# Patient Record
Sex: FEMALE | Race: WHITE | NOT HISPANIC OR LATINO | Employment: PART TIME | URBAN - METROPOLITAN AREA
[De-identification: names, ages, dates, MRNs, and addresses within clinical notes are randomized per-mention and may not be internally consistent; named-entity substitution may affect disease eponyms.]

---

## 2024-01-11 ENCOUNTER — LAB REQUISITION (OUTPATIENT)
Dept: LAB | Facility: HOSPITAL | Age: 64
End: 2024-01-11
Payer: COMMERCIAL

## 2024-01-11 DIAGNOSIS — L97.522 NON-PRESSURE CHRONIC ULCER OF OTHER PART OF LEFT FOOT WITH FAT LAYER EXPOSED (MULTI): ICD-10-CM

## 2024-01-11 PROCEDURE — 87075 CULTR BACTERIA EXCEPT BLOOD: CPT

## 2024-01-11 PROCEDURE — 87185 SC STD ENZYME DETCJ PER NZM: CPT

## 2024-01-11 PROCEDURE — 87205 SMEAR GRAM STAIN: CPT

## 2024-01-11 PROCEDURE — 87186 SC STD MICRODIL/AGAR DIL: CPT

## 2024-01-11 PROCEDURE — 87070 CULTURE OTHR SPECIMN AEROBIC: CPT

## 2024-01-14 LAB
B-LACTAMASE ORGANISM ISLT: POSITIVE
BACTERIA SPEC CULT: ABNORMAL
BACTERIA SPEC CULT: ABNORMAL
GRAM STN SPEC: ABNORMAL
GRAM STN SPEC: ABNORMAL

## 2024-03-05 ENCOUNTER — HOSPITAL ENCOUNTER (OUTPATIENT)
Dept: RADIOLOGY | Facility: HOSPITAL | Age: 64
Discharge: HOME | End: 2024-03-05
Payer: COMMERCIAL

## 2024-03-05 ENCOUNTER — OFFICE VISIT (OUTPATIENT)
Dept: WOUND CARE | Facility: HOSPITAL | Age: 64
End: 2024-03-05
Payer: COMMERCIAL

## 2024-03-05 DIAGNOSIS — L89.893 PRESSURE INJURY OF RIGHT FOOT, STAGE 3 (MULTI): ICD-10-CM

## 2024-03-05 PROCEDURE — 99214 OFFICE O/P EST MOD 30 MIN: CPT | Mod: 25

## 2024-03-05 PROCEDURE — 87186 SC STD MICRODIL/AGAR DIL: CPT | Mod: WESLAB

## 2024-03-05 PROCEDURE — 11042 DBRDMT SUBQ TIS 1ST 20SQCM/<: CPT

## 2024-03-05 PROCEDURE — 73630 X-RAY EXAM OF FOOT: CPT | Mod: RT

## 2024-03-08 LAB
BACTERIA SPEC CULT: ABNORMAL
GRAM STN SPEC: ABNORMAL
GRAM STN SPEC: ABNORMAL

## 2024-03-14 ENCOUNTER — OFFICE VISIT (OUTPATIENT)
Dept: WOUND CARE | Facility: HOSPITAL | Age: 64
DRG: 574 | End: 2024-03-14
Payer: COMMERCIAL

## 2024-03-14 ENCOUNTER — APPOINTMENT (OUTPATIENT)
Dept: RADIOLOGY | Facility: HOSPITAL | Age: 64
DRG: 574 | End: 2024-03-14
Payer: COMMERCIAL

## 2024-03-14 ENCOUNTER — HOSPITAL ENCOUNTER (INPATIENT)
Facility: HOSPITAL | Age: 64
LOS: 6 days | Discharge: HOME | DRG: 574 | End: 2024-03-20
Attending: EMERGENCY MEDICINE | Admitting: INTERNAL MEDICINE
Payer: COMMERCIAL

## 2024-03-14 DIAGNOSIS — L02.619 CELLULITIS AND ABSCESS OF FOOT: ICD-10-CM

## 2024-03-14 DIAGNOSIS — L03.115 CELLULITIS OF RIGHT LOWER EXTREMITY: Primary | ICD-10-CM

## 2024-03-14 DIAGNOSIS — L02.91 ABSCESS: ICD-10-CM

## 2024-03-14 DIAGNOSIS — M86.9 OSTEOMYELITIS OF ANKLE AND FOOT (MULTI): ICD-10-CM

## 2024-03-14 DIAGNOSIS — L03.119 CELLULITIS AND ABSCESS OF FOOT: ICD-10-CM

## 2024-03-14 LAB
ALBUMIN SERPL-MCNC: 4 G/DL (ref 3.5–5)
ALP BLD-CCNC: 73 U/L (ref 35–125)
ALT SERPL-CCNC: 13 U/L (ref 5–40)
ANION GAP SERPL CALC-SCNC: 13 MMOL/L
AST SERPL-CCNC: 15 U/L (ref 5–40)
BASOPHILS # BLD AUTO: 0.02 X10*3/UL (ref 0–0.1)
BASOPHILS NFR BLD AUTO: 0.3 %
BILIRUB SERPL-MCNC: <0.2 MG/DL (ref 0.1–1.2)
BUN SERPL-MCNC: 16 MG/DL (ref 8–25)
CALCIUM SERPL-MCNC: 9.3 MG/DL (ref 8.5–10.4)
CHLORIDE SERPL-SCNC: 97 MMOL/L (ref 97–107)
CO2 SERPL-SCNC: 26 MMOL/L (ref 24–31)
CREAT SERPL-MCNC: 0.4 MG/DL (ref 0.4–1.6)
EGFRCR SERPLBLD CKD-EPI 2021: >90 ML/MIN/1.73M*2
EOSINOPHIL # BLD AUTO: 0.33 X10*3/UL (ref 0–0.7)
EOSINOPHIL NFR BLD AUTO: 4.5 %
ERYTHROCYTE [DISTWIDTH] IN BLOOD BY AUTOMATED COUNT: 14.2 % (ref 11.5–14.5)
EST. AVERAGE GLUCOSE BLD GHB EST-MCNC: 120 MG/DL
GLUCOSE SERPL-MCNC: 101 MG/DL (ref 65–99)
HBA1C MFR BLD: 5.8 %
HCT VFR BLD AUTO: 38 % (ref 36–46)
HGB BLD-MCNC: 12.2 G/DL (ref 12–16)
IMM GRANULOCYTES # BLD AUTO: 0.03 X10*3/UL (ref 0–0.7)
IMM GRANULOCYTES NFR BLD AUTO: 0.4 % (ref 0–0.9)
LYMPHOCYTES # BLD AUTO: 0.93 X10*3/UL (ref 1.2–4.8)
LYMPHOCYTES NFR BLD AUTO: 12.8 %
MCH RBC QN AUTO: 30.3 PG (ref 26–34)
MCHC RBC AUTO-ENTMCNC: 32.1 G/DL (ref 32–36)
MCV RBC AUTO: 95 FL (ref 80–100)
MONOCYTES # BLD AUTO: 0.58 X10*3/UL (ref 0.1–1)
MONOCYTES NFR BLD AUTO: 8 %
NEUTROPHILS # BLD AUTO: 5.4 X10*3/UL (ref 1.2–7.7)
NEUTROPHILS NFR BLD AUTO: 74 %
NRBC BLD-RTO: 0 /100 WBCS (ref 0–0)
PLATELET # BLD AUTO: 352 X10*3/UL (ref 150–450)
POTASSIUM SERPL-SCNC: 4.2 MMOL/L (ref 3.4–5.1)
PROT SERPL-MCNC: 7.7 G/DL (ref 5.9–7.9)
RBC # BLD AUTO: 4.02 X10*6/UL (ref 4–5.2)
SODIUM SERPL-SCNC: 136 MMOL/L (ref 133–145)
WBC # BLD AUTO: 7.3 X10*3/UL (ref 4.4–11.3)

## 2024-03-14 PROCEDURE — 73630 X-RAY EXAM OF FOOT: CPT | Mod: RIGHT SIDE | Performed by: RADIOLOGY

## 2024-03-14 PROCEDURE — 85025 COMPLETE CBC W/AUTO DIFF WBC: CPT | Performed by: EMERGENCY MEDICINE

## 2024-03-14 PROCEDURE — 87040 BLOOD CULTURE FOR BACTERIA: CPT | Mod: WESLAB | Performed by: EMERGENCY MEDICINE

## 2024-03-14 PROCEDURE — 73630 X-RAY EXAM OF FOOT: CPT | Mod: RT

## 2024-03-14 PROCEDURE — 80053 COMPREHEN METABOLIC PANEL: CPT | Performed by: EMERGENCY MEDICINE

## 2024-03-14 PROCEDURE — 96365 THER/PROPH/DIAG IV INF INIT: CPT

## 2024-03-14 PROCEDURE — 99285 EMERGENCY DEPT VISIT HI MDM: CPT | Mod: 25

## 2024-03-14 PROCEDURE — 96375 TX/PRO/DX INJ NEW DRUG ADDON: CPT

## 2024-03-14 PROCEDURE — 87070 CULTURE OTHR SPECIMN AEROBIC: CPT | Mod: WESLAB

## 2024-03-14 PROCEDURE — 83036 HEMOGLOBIN GLYCOSYLATED A1C: CPT

## 2024-03-14 PROCEDURE — 2500000004 HC RX 250 GENERAL PHARMACY W/ HCPCS (ALT 636 FOR OP/ED)

## 2024-03-14 PROCEDURE — 2500000004 HC RX 250 GENERAL PHARMACY W/ HCPCS (ALT 636 FOR OP/ED): Performed by: EMERGENCY MEDICINE

## 2024-03-14 PROCEDURE — 36415 COLL VENOUS BLD VENIPUNCTURE: CPT | Performed by: EMERGENCY MEDICINE

## 2024-03-14 PROCEDURE — 1210000001 HC SEMI-PRIVATE ROOM DAILY

## 2024-03-14 PROCEDURE — 11042 DBRDMT SUBQ TIS 1ST 20SQCM/<: CPT

## 2024-03-14 RX ORDER — LINEZOLID 2 MG/ML
600 INJECTION, SOLUTION INTRAVENOUS EVERY 12 HOURS
Status: DISCONTINUED | OUTPATIENT
Start: 2024-03-14 | End: 2024-03-15

## 2024-03-14 RX ORDER — IBUPROFEN 100 MG/5ML
2000 SUSPENSION, ORAL (FINAL DOSE FORM) ORAL DAILY
COMMUNITY

## 2024-03-14 RX ORDER — ASPIRIN 81 MG/1
81 TABLET ORAL DAILY
Status: DISCONTINUED | OUTPATIENT
Start: 2024-03-15 | End: 2024-03-20 | Stop reason: HOSPADM

## 2024-03-14 RX ORDER — OXYCODONE AND ACETAMINOPHEN 5; 325 MG/1; MG/1
1 TABLET ORAL EVERY 4 HOURS PRN
Status: DISCONTINUED | OUTPATIENT
Start: 2024-03-14 | End: 2024-03-20 | Stop reason: HOSPADM

## 2024-03-14 RX ORDER — ONDANSETRON 4 MG/1
4 TABLET, FILM COATED ORAL EVERY 8 HOURS PRN
Status: DISCONTINUED | OUTPATIENT
Start: 2024-03-14 | End: 2024-03-20 | Stop reason: HOSPADM

## 2024-03-14 RX ORDER — ASPIRIN 81 MG/1
81 TABLET ORAL DAILY
COMMUNITY

## 2024-03-14 RX ORDER — DEXTROMETHORPHAN HYDROBROMIDE, GUAIFENESIN 5; 100 MG/5ML; MG/5ML
650 LIQUID ORAL EVERY 8 HOURS PRN
COMMUNITY

## 2024-03-14 RX ORDER — ASCORBIC ACID 500 MG
2000 TABLET ORAL DAILY
Status: DISCONTINUED | OUTPATIENT
Start: 2024-03-15 | End: 2024-03-15

## 2024-03-14 RX ORDER — DEXAMETHASONE SODIUM PHOSPHATE 100 MG/10ML
10 INJECTION INTRAMUSCULAR; INTRAVENOUS ONCE
Status: COMPLETED | OUTPATIENT
Start: 2024-03-14 | End: 2024-03-14

## 2024-03-14 RX ORDER — ELECTROLYTES/DEXTROSE
10 SOLUTION, ORAL ORAL DAILY
COMMUNITY

## 2024-03-14 RX ORDER — MULTIVIT-MIN/IRON FUM/FOLIC AC 7.5 MG-4
1 TABLET ORAL DAILY
Status: DISCONTINUED | OUTPATIENT
Start: 2024-03-15 | End: 2024-03-15

## 2024-03-14 RX ORDER — SODIUM CHLORIDE 9 MG/ML
50 INJECTION, SOLUTION INTRAVENOUS CONTINUOUS
Status: DISCONTINUED | OUTPATIENT
Start: 2024-03-14 | End: 2024-03-15

## 2024-03-14 RX ORDER — ACETAMINOPHEN 325 MG/1
650 TABLET ORAL EVERY 6 HOURS PRN
Status: DISCONTINUED | OUTPATIENT
Start: 2024-03-14 | End: 2024-03-20 | Stop reason: HOSPADM

## 2024-03-14 RX ORDER — DIPHENHYDRAMINE HYDROCHLORIDE 50 MG/ML
25 INJECTION INTRAMUSCULAR; INTRAVENOUS EVERY 6 HOURS PRN
Status: DISCONTINUED | OUTPATIENT
Start: 2024-03-14 | End: 2024-03-20 | Stop reason: HOSPADM

## 2024-03-14 RX ORDER — PANTOPRAZOLE SODIUM 40 MG/1
40 TABLET, DELAYED RELEASE ORAL DAILY
Status: DISCONTINUED | OUTPATIENT
Start: 2024-03-14 | End: 2024-03-20 | Stop reason: HOSPADM

## 2024-03-14 RX ORDER — HEPARIN SODIUM 5000 [USP'U]/ML
5000 INJECTION, SOLUTION INTRAVENOUS; SUBCUTANEOUS EVERY 12 HOURS
Status: DISCONTINUED | OUTPATIENT
Start: 2024-03-14 | End: 2024-03-20 | Stop reason: HOSPADM

## 2024-03-14 RX ORDER — DIPHENHYDRAMINE HYDROCHLORIDE 50 MG/ML
25 INJECTION INTRAMUSCULAR; INTRAVENOUS ONCE
Status: COMPLETED | OUTPATIENT
Start: 2024-03-14 | End: 2024-03-14

## 2024-03-14 RX ORDER — UBIDECARENONE 75 MG
500 CAPSULE ORAL DAILY
COMMUNITY

## 2024-03-14 RX ORDER — GLUCOSAMINE HCL 500 MG
2 TABLET ORAL DAILY
COMMUNITY

## 2024-03-14 RX ORDER — UBIDECARENONE 75 MG
500 CAPSULE ORAL DAILY
Status: DISCONTINUED | OUTPATIENT
Start: 2024-03-15 | End: 2024-03-15

## 2024-03-14 RX ADMIN — SODIUM CHLORIDE 50 ML/HR: 900 INJECTION, SOLUTION INTRAVENOUS at 21:26

## 2024-03-14 RX ADMIN — PIPERACILLIN SODIUM AND TAZOBACTAM SODIUM 3.38 G: 3; .375 INJECTION, SOLUTION INTRAVENOUS at 17:48

## 2024-03-14 RX ADMIN — SODIUM CHLORIDE 1000 ML: 900 INJECTION, SOLUTION INTRAVENOUS at 18:16

## 2024-03-14 RX ADMIN — DEXAMETHASONE SODIUM PHOSPHATE 10 MG: 10 INJECTION INTRAMUSCULAR; INTRAVENOUS at 18:14

## 2024-03-14 RX ADMIN — DIPHENHYDRAMINE HYDROCHLORIDE 25 MG: 50 INJECTION, SOLUTION INTRAMUSCULAR; INTRAVENOUS at 18:14

## 2024-03-14 RX ADMIN — LINEZOLID 600 MG: 600 INJECTION, SOLUTION INTRAVENOUS at 21:26

## 2024-03-14 ASSESSMENT — COGNITIVE AND FUNCTIONAL STATUS - GENERAL
DAILY ACTIVITIY SCORE: 24
MOBILITY SCORE: 24

## 2024-03-14 ASSESSMENT — COLUMBIA-SUICIDE SEVERITY RATING SCALE - C-SSRS
1. IN THE PAST MONTH, HAVE YOU WISHED YOU WERE DEAD OR WISHED YOU COULD GO TO SLEEP AND NOT WAKE UP?: NO
2. HAVE YOU ACTUALLY HAD ANY THOUGHTS OF KILLING YOURSELF?: NO
6. HAVE YOU EVER DONE ANYTHING, STARTED TO DO ANYTHING, OR PREPARED TO DO ANYTHING TO END YOUR LIFE?: NO

## 2024-03-14 ASSESSMENT — ENCOUNTER SYMPTOMS
HEMATOLOGIC/LYMPHATIC NEGATIVE: 1
CARDIOVASCULAR NEGATIVE: 1
PSYCHIATRIC NEGATIVE: 1
ACTIVITY CHANGE: 1
EYES NEGATIVE: 1
ALLERGIC/IMMUNOLOGIC NEGATIVE: 1
WOUND: 1
RESPIRATORY NEGATIVE: 1
ENDOCRINE NEGATIVE: 1
GASTROINTESTINAL NEGATIVE: 1
COLOR CHANGE: 1

## 2024-03-14 ASSESSMENT — PAIN DESCRIPTION - ORIENTATION: ORIENTATION: RIGHT

## 2024-03-14 ASSESSMENT — PAIN DESCRIPTION - LOCATION: LOCATION: FOOT

## 2024-03-14 ASSESSMENT — PAIN - FUNCTIONAL ASSESSMENT: PAIN_FUNCTIONAL_ASSESSMENT: 0-10

## 2024-03-14 ASSESSMENT — PAIN DESCRIPTION - DESCRIPTORS: DESCRIPTORS: THROBBING

## 2024-03-14 ASSESSMENT — PAIN SCALES - GENERAL: PAINLEVEL_OUTOF10: 4

## 2024-03-14 NOTE — PROGRESS NOTES
Pharmacy Medication History Review    Regina Flaherty is a 63 y.o. female admitted for No Principal Problem: There is no principal problem currently on the Problem List. Please update the Problem List and refresh.. Pharmacy reviewed the patient's [id number] medications and allergies for accuracy.    Medications ADDED:  ALL  Medications CHANGED:  none  Medications REMOVED:   none     The list below reflects the updated PTA list. Comments regarding how patient may be taking medications differently can be found in the Admit Orders Activity  Prior to Admission Medications   Prescriptions Last Dose Informant   acetaminophen (Tylenol 8 HOUR) 650 mg ER tablet  Self   Sig: Take 1 tablet (650 mg) by mouth every 8 hours if needed for mild pain (1 - 3). Do not crush, chew, or split.   ascorbic acid (Vitamin C) 1,000 mg tablet  Self   Sig: Take 2 tablets (2,000 mg) by mouth once daily.   ascorbic acid/collagen hydr (COLLAGEN SKIN RENEWAL ORAL)  Self   Sig: Take 2,500 mg by mouth once daily.   aspirin 81 mg EC tablet  Self   Sig: Take 1 tablet (81 mg) by mouth once daily.   biotin 5 mg capsule  Self   Sig: Take 2 capsules (10 mg) by mouth once daily.   co-enzyme Q-10 50 mg capsule  Self   Sig: Take 2 capsules by mouth once daily.   cyanocobalamin (Vitamin B-12) 500 mcg tablet  Self   Sig: Take 1 tablet (500 mcg) by mouth once daily.   glucosamine HCl/chondroitin snell (GLUCOSAMINE-CHONDROITIN ORAL)  Self   Sig: Take 2 tablets by mouth once daily. 1500/1200   multivitamin with minerals iron-free (Centrum Silver)  Self   Sig: Take 1 tablet by mouth once daily.      Facility-Administered Medications: None        The list below reflects the updated allergy list. Please review each documented allergy for additional clarification and justification.  Allergies  Reviewed by Prabha Montez on 3/14/2024        Severity Reactions Comments    Doxycycline Low Rash Sore throat    Keflex [cephalexin] Low Rash             Pharmacy has  been updated to [facility] [employer].    Sources used to complete the med history include medication history, visit summaries, patient interview. Patient is a good historian.    Below are additional concerns with the patient's PTA list.  Patient just finished a course of cephalexin. She has had an allergic reaction to the therapy. Before the cephalexin she was on bactrim. A provider today re-prescribed Bactrim but she did not pick it up. She came here instead.    Prabha Montez  Please reach out via [employer] Secure Chat for questions

## 2024-03-14 NOTE — ED PROVIDER NOTES
HPI   Chief Complaint   Patient presents with    Foot Wound Check     Patient was at the wound clinic and instructed to come in for possible abscess.  Has pus coming out.       Patient presents to the emergency department today after being sent in from a local wound care center.  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  Patient states that her dog jumped on the right foot, and she developed redness and swelling over the lateral aspect of the dorsum of the right foot.  The patient states that she had 1 area that had been opened up and was packed earlier today, but the doctor was concerned about the other, and felt there may be a developing abscess.  They insisted the patient come to the hospital for further evaluation.  Patient herself states there is no nausea or vomiting.  Patient states there is no fevers or chills.  The patient states that she has not had any other associated symptoms.  The patient basically was told to come directly to the hospital for admission.      [place] Coma Scale Score: 15       Patient History   Past Medical History:   Diagnosis Date    Arthritis      Past Surgical History:   Procedure Laterality Date    OTHER SURGICAL HISTORY  2019     section    OTHER SURGICAL HISTORY  2019    Hysterectomy    OTHER SURGICAL HISTORY  2019    Cystocele repair    OTHER SURGICAL HISTORY  2019    Mid-urethral sling insertion    OTHER SURGICAL HISTORY  2019    Abdominal panniculectomy    OTHER SURGICAL HISTORY  2019    Foot surgery    OTHER SURGICAL HISTORY  11/15/2019    Sacrocolpopexy    OTHER SURGICAL HISTORY  11/15/2019    Rectocele repair     No family history on file.  Social History     Tobacco Use    Smoking status: Never    Smokeless tobacco: Never   Substance Use Topics    Alcohol use: Never    Drug use: Never       Physical Exam   ED Triage Vitals [24 1410]   Temperature Heart Rate Respirations BP   37 °C  (98.6 °F) 72 12 153/76      Pulse Ox Temp src Heart Rate Source Patient Position   97 % -- -- --      BP Location FiO2 (%)     -- --       Physical Exam  Vitals and nursing note reviewed.   Constitutional:       General: She is not in acute distress.     Appearance: She is well-developed.   HENT:      Head: Normocephalic and atraumatic.   Eyes:      Conjunctiva/sclera: Conjunctivae normal.   Cardiovascular:      Rate and Rhythm: Normal rate and regular rhythm.      Heart sounds: No murmur heard.  Pulmonary:      Effort: Pulmonary effort is normal. No respiratory distress.      Breath sounds: Normal breath sounds.   Abdominal:      Palpations: Abdomen is soft.      Tenderness: There is no abdominal tenderness.   Musculoskeletal:         General: No swelling.      Cervical back: Neck supple.   Skin:     General: Skin is warm and dry.      Capillary Refill: Capillary refill takes less than 2 seconds.      Comments: Erythema over the dorsum of the right foot, with small area of flocculence approxi-1 cm in diameter to the lateral aspect of the distal foot.  No lymphangitis.   Neurological:      Mental Status: She is alert.   Psychiatric:         Mood and Affect: Mood normal.         ED Course & MDM   ED Course as of 03/14/24 1817   Thu Mar 14, 2024   1807 I did speak with Dr. Magallon, and they are requesting that we admit the patient to the hospital for IV antibiotics, and also for further evaluation.  I will contact them for admission. [FR]   1817 I did speak to hospitalist, and he is willing to write admission orders.  I did let the admitting doctor know that the patient did have a reaction to the Zosyn that was given to her. [FR]      ED Course User Index  [FR] Reynold Dey DO         Diagnoses as of 03/14/24 1817   Cellulitis of right lower extremity       Medical Decision Making  After my initial evaluation, I am going to contact the podiatrist, and I do think the patient was likely is going to require  admission to the hospital secondary to fact that she will require IV antibiotics.  The patient will otherwise have blood work done as well.    Amount and/or Complexity of Data Reviewed  Labs:  Decision-making details documented in ED Course.  Radiology:  Decision-making details documented in ED Course.  ECG/medicine tests:  Decision-making details documented in ED Course.      Labs Reviewed   CBC WITH AUTO DIFFERENTIAL - Abnormal       Result Value    WBC 7.3      nRBC 0.0      RBC 4.02      Hemoglobin 12.2      Hematocrit 38.0      MCV 95      MCH 30.3      MCHC 32.1      RDW 14.2      Platelets 352      Neutrophils % 74.0      Immature Granulocytes %, Automated 0.4      Lymphocytes % 12.8      Monocytes % 8.0      Eosinophils % 4.5      Basophils % 0.3      Neutrophils Absolute 5.40      Immature Granulocytes Absolute, Automated 0.03      Lymphocytes Absolute 0.93 (*)     Monocytes Absolute 0.58      Eosinophils Absolute 0.33      Basophils Absolute 0.02     COMPREHENSIVE METABOLIC PANEL - Abnormal    Glucose 101 (*)     Sodium 136      Potassium 4.2      Chloride 97      Bicarbonate 26      Urea Nitrogen 16      Creatinine 0.40      eGFR >90      Calcium 9.3      Albumin 4.0      Alkaline Phosphatase 73      Total Protein 7.7      AST 15      Bilirubin, Total <0.2      ALT 13      Anion Gap 13     BLOOD CULTURE   BLOOD CULTURE   HEMOGLOBIN A1C      XR foot right 3+ views   Final Result   No evidence for acute osseous abnormality. No bony erosion to suggest   osteomyelitis. No subcutaneous gas is seen.        Signed by: Slava Masters 3/14/2024 5:28 PM   Dictation workstation:   [id number]         Procedure  Procedures         Reynold Dey DO  03/14/24 1945

## 2024-03-14 NOTE — ED NOTES
Pt had allergic reaction to zosyn.. Pt became red and itchy. Pt then had a episode of hypotension and bradycardia. Dr Dey was notified. IV fluids benadryl and decadron was ordered.      Deidra Otto, LICHA  03/14/24 9669    
Contraindicated

## 2024-03-14 NOTE — H&P
History Of Present Illness  Regina Flaherty is a 63 y.o. female presenting with cellulitis of right lower extremity.    53-year-old female presents to [facility] for chief complaint of cellulitis of right lower extremity.  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    Patient states that 2 weeks ago, her dog jumped on her right foot and she subsequently developed erythema and edema on the dorsal aspect of her right foot.  She states that she visited her PCP who gave her Keflex which she developed rash to.  She was subsequently given Bactrim and finished this course of antibiotics.    However, today, when she visited the wound care clinic for follow-up clinic, she was sent to emergency room when it was noted that her right lower extremity was erythematous, edematous and was concern of abscess and cellulitis.    She will be admitted under the hospitalist service for further evaluation and treatment.     Past Medical History  Past Medical History:   Diagnosis Date    Arthritis     Diabetes mellitus (CMS/HCC)        Surgical History  Past Surgical History:   Procedure Laterality Date    OTHER SURGICAL HISTORY  2019     section    OTHER SURGICAL HISTORY  2019    Hysterectomy    OTHER SURGICAL HISTORY  2019    Cystocele repair    OTHER SURGICAL HISTORY  2019    Mid-urethral sling insertion    OTHER SURGICAL HISTORY  2019    Abdominal panniculectomy    OTHER SURGICAL HISTORY  2019    Foot surgery    OTHER SURGICAL HISTORY  11/15/2019    Sacrocolpopexy    OTHER SURGICAL HISTORY  11/15/2019    Rectocele repair        Social History  She reports that she has never smoked. She has never used smokeless tobacco. She reports that she does not drink alcohol and does not use drugs.    Family History  No family history on file.     Allergies  Doxycycline and  Keflex [cephalexin]    Review of Systems   Constitutional:  Positive for activity change.   HENT: Negative.     Eyes: Negative.    Respiratory: Negative.     Cardiovascular: Negative.    Gastrointestinal: Negative.    Endocrine: Negative.    Genitourinary: Negative.    Musculoskeletal:  Positive for gait problem.   Skin:  Positive for color change, rash and wound.   Allergic/Immunologic: Negative.    Hematological: Negative.    Psychiatric/Behavioral: Negative.          Physical Exam  Vitals and nursing note reviewed. Exam conducted with a chaperone present.   Constitutional:       Appearance: Normal appearance. She is normal weight.   HENT:      Head: Normocephalic and atraumatic.      Nose: Nose normal.      Mouth/Throat:      Mouth: Mucous membranes are moist.   Eyes:      Extraocular Movements: Extraocular movements intact.      Pupils: Pupils are equal, round, and reactive to light.   Cardiovascular:      Rate and Rhythm: Normal rate.      Pulses: Normal pulses.      Heart sounds: Normal heart sounds.   Pulmonary:      Effort: Pulmonary effort is normal.      Breath sounds: Normal breath sounds.   Abdominal:      General: Bowel sounds are normal.      Palpations: Abdomen is soft.   Musculoskeletal:         General: Swelling, tenderness and deformity present. Normal range of motion.      Cervical back: Normal range of motion and neck supple.      Right lower leg: Edema present.      Comments: Erythematous rash noted rle   Skin:     Capillary Refill: Capillary refill takes less than 2 seconds.      Findings: Erythema and rash present.      Comments: Erythema over the dorsum of the right foot,   Open Ulcer posterior aspect of left foot,   Neurological:      General: No focal deficit present.      Mental Status: She is alert and oriented to person, place, and time.   Psychiatric:         Mood and Affect: Mood normal.         Behavior: Behavior normal.          Last Recorded Vitals  Blood pressure 153/76, pulse 66,  "temperature 37 °C (98.6 °F), resp. rate 10, height 1.676 m (5' 6\"), weight 77.1 kg (170 lb), SpO2 98 %.    Relevant Results        Results for orders placed or performed during the hospital encounter of 03/14/24 (from the past 24 hour(s))   CBC and Auto Differential   Result Value Ref Range    WBC 7.3 4.4 - 11.3 x10*3/uL    nRBC 0.0 0.0 - 0.0 /100 WBCs    RBC 4.02 4.00 - 5.20 x10*6/uL    Hemoglobin 12.2 12.0 - 16.0 g/dL    Hematocrit 38.0 36.0 - 46.0 %    MCV 95 80 - 100 fL    MCH 30.3 26.0 - 34.0 pg    MCHC 32.1 32.0 - 36.0 g/dL    RDW 14.2 11.5 - 14.5 %    Platelets 352 150 - 450 x10*3/uL    Neutrophils % 74.0 40.0 - 80.0 %    Immature Granulocytes %, Automated 0.4 0.0 - 0.9 %    Lymphocytes % 12.8 13.0 - 44.0 %    Monocytes % 8.0 2.0 - 10.0 %    Eosinophils % 4.5 0.0 - 6.0 %    Basophils % 0.3 0.0 - 2.0 %    Neutrophils Absolute 5.40 1.20 - 7.70 x10*3/uL    Immature Granulocytes Absolute, Automated 0.03 0.00 - 0.70 x10*3/uL    Lymphocytes Absolute 0.93 (L) 1.20 - 4.80 x10*3/uL    Monocytes Absolute 0.58 0.10 - 1.00 x10*3/uL    Eosinophils Absolute 0.33 0.00 - 0.70 x10*3/uL    Basophils Absolute 0.02 0.00 - 0.10 x10*3/uL   Comprehensive metabolic panel   Result Value Ref Range    Glucose 101 (H) 65 - 99 mg/dL    Sodium 136 133 - 145 mmol/L    Potassium 4.2 3.4 - 5.1 mmol/L    Chloride 97 97 - 107 mmol/L    Bicarbonate 26 24 - 31 mmol/L    Urea Nitrogen 16 8 - 25 mg/dL    Creatinine 0.40 0.40 - 1.60 mg/dL    eGFR >90 >60 mL/min/1.73m*2    Calcium 9.3 8.5 - 10.4 mg/dL    Albumin 4.0 3.5 - 5.0 g/dL    Alkaline Phosphatase 73 35 - 125 U/L    Total Protein 7.7 5.9 - 7.9 g/dL    AST 15 5 - 40 U/L    Bilirubin, Total <0.2 0.1 - 1.2 mg/dL    ALT 13 5 - 40 U/L    Anion Gap 13 <=19 mmol/L    XR foot right 3+ views    Result Date: 3/14/2024  Interpreted By:  Slava Masters, STUDY: XR FOOT RIGHT 3+ VIEWS; 3/14/2024 5:11 pm   INDICATION: Signs/Symptoms:pain and possible abscess. Pain   COMPARISON: 03/05/2024   ACCESSION " NUMBER(S): [id number]   ORDERING CLINICIAN: DAV MCCORD   TECHNIQUE: Views: Right foot AP, Lat, Oblique   FINDINGS: No evidence for acute fracture or dislocation. 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 No definite bony erosion to suggest osteomyelitis. No subcutaneous gas is seen.       No evidence for acute osseous abnormality. No bony erosion to suggest osteomyelitis. No subcutaneous gas is seen.   Signed by: Slava Masters 3/14/2024 5:28 PM Dictation workstation:   [id number]    XR foot right 3+ views    Result Date: 3/5/2024  Interpreted By:  Naveen Stiles, STUDY: XR FOOT RIGHT 3+ VIEWS; ;  3/5/2024 3:29 pm   INDICATION: Signs/Symptoms:Possible osteomylitis.   COMPARISON: 12/06/2019   ACCESSION NUMBER(S): [id number]   ORDERING CLINICIAN: LENO DIEZ   FINDINGS: The mutations through the distal metatarsal bones. No new focal lytic lesion or abnormal periosteal reaction is visualized to suggest acute osteomyelitis. There is pes planus deformity. Calcaneal spur is present. There is mild diffuse soft tissue swelling.       Previous amputations through the distal metatarsal bones. Diffuse soft tissue swelling without radiographic evidence of acute osteomyelitis. Correlate clinically and follow-up as needed.   Signed by: Naveen Stiles 3/5/2024 4:47 PM Dictation workstation:   [id number]       Assessment/Plan   Active Problems:    Cellulitis and abscess of foot      Cellulitis and abscess of right lower extremity  Left lower extremity ulcer  Patient has multiple allergies.  Will initiate IV Zyvox  Consult with infectious disease, podiatry.   Patient follows with Dr. Magallon  Consult wound care nurse  Wound culture from March 5, 2024, January 2024, positive for MSSA  Monitor CBC. Currently afebrile, without leukocytosis  X-ray negative for osteomyelitis or gas  Blood cultures pending  Gentle IV fluids  As needed acetaminophen, as needed pain  management    History of diabetes  Patient states she does not have diabetes anymore.  Obtain hemoglobin A1c    Altered gait  PT OT  Falls precautions    DVT/GI prophylaxis  Heparin subcu, PPI             I spent 55 minutes in the professional and overall care of this patient.      Shira Pan, APRN-CNP

## 2024-03-15 LAB
ANION GAP SERPL CALC-SCNC: 13 MMOL/L
BASOPHILS # BLD AUTO: 0.02 X10*3/UL (ref 0–0.1)
BASOPHILS NFR BLD AUTO: 0.3 %
BUN SERPL-MCNC: 18 MG/DL (ref 8–25)
CALCIUM SERPL-MCNC: 9.2 MG/DL (ref 8.5–10.4)
CHLORIDE SERPL-SCNC: 106 MMOL/L (ref 97–107)
CO2 SERPL-SCNC: 25 MMOL/L (ref 24–31)
CREAT SERPL-MCNC: 0.4 MG/DL (ref 0.4–1.6)
EGFRCR SERPLBLD CKD-EPI 2021: >90 ML/MIN/1.73M*2
EOSINOPHIL # BLD AUTO: 0.02 X10*3/UL (ref 0–0.7)
EOSINOPHIL NFR BLD AUTO: 0.3 %
ERYTHROCYTE [DISTWIDTH] IN BLOOD BY AUTOMATED COUNT: 14.2 % (ref 11.5–14.5)
GLUCOSE SERPL-MCNC: 126 MG/DL (ref 65–99)
HCT VFR BLD AUTO: 37.8 % (ref 36–46)
HGB BLD-MCNC: 12.4 G/DL (ref 12–16)
IMM GRANULOCYTES # BLD AUTO: 0.02 X10*3/UL (ref 0–0.7)
IMM GRANULOCYTES NFR BLD AUTO: 0.3 % (ref 0–0.9)
LYMPHOCYTES # BLD AUTO: 0.63 X10*3/UL (ref 1.2–4.8)
LYMPHOCYTES NFR BLD AUTO: 9.6 %
MCH RBC QN AUTO: 30.9 PG (ref 26–34)
MCHC RBC AUTO-ENTMCNC: 32.8 G/DL (ref 32–36)
MCV RBC AUTO: 94 FL (ref 80–100)
MONOCYTES # BLD AUTO: 0.57 X10*3/UL (ref 0.1–1)
MONOCYTES NFR BLD AUTO: 8.7 %
NEUTROPHILS # BLD AUTO: 5.29 X10*3/UL (ref 1.2–7.7)
NEUTROPHILS NFR BLD AUTO: 80.8 %
NRBC BLD-RTO: 0 /100 WBCS (ref 0–0)
PLATELET # BLD AUTO: 355 X10*3/UL (ref 150–450)
POTASSIUM SERPL-SCNC: 3.7 MMOL/L (ref 3.4–5.1)
RBC # BLD AUTO: 4.01 X10*6/UL (ref 4–5.2)
SODIUM SERPL-SCNC: 144 MMOL/L (ref 133–145)
WBC # BLD AUTO: 6.6 X10*3/UL (ref 4.4–11.3)

## 2024-03-15 PROCEDURE — 2500000001 HC RX 250 WO HCPCS SELF ADMINISTERED DRUGS (ALT 637 FOR MEDICARE OP)

## 2024-03-15 PROCEDURE — 1210000001 HC SEMI-PRIVATE ROOM DAILY

## 2024-03-15 PROCEDURE — 36415 COLL VENOUS BLD VENIPUNCTURE: CPT

## 2024-03-15 PROCEDURE — 2500000001 HC RX 250 WO HCPCS SELF ADMINISTERED DRUGS (ALT 637 FOR MEDICARE OP): Performed by: INTERNAL MEDICINE

## 2024-03-15 PROCEDURE — 85025 COMPLETE CBC W/AUTO DIFF WBC: CPT

## 2024-03-15 PROCEDURE — 2500000004 HC RX 250 GENERAL PHARMACY W/ HCPCS (ALT 636 FOR OP/ED)

## 2024-03-15 PROCEDURE — 82374 ASSAY BLOOD CARBON DIOXIDE: CPT

## 2024-03-15 RX ORDER — LORAZEPAM 0.5 MG/1
0.5 TABLET ORAL EVERY 8 HOURS PRN
Status: DISCONTINUED | OUTPATIENT
Start: 2024-03-15 | End: 2024-03-20 | Stop reason: HOSPADM

## 2024-03-15 RX ORDER — LINEZOLID 600 MG/1
600 TABLET, FILM COATED ORAL EVERY 12 HOURS SCHEDULED
Status: DISCONTINUED | OUTPATIENT
Start: 2024-03-15 | End: 2024-03-20

## 2024-03-15 RX ADMIN — LINEZOLID 600 MG: 600 TABLET, FILM COATED ORAL at 18:51

## 2024-03-15 RX ADMIN — ACETAMINOPHEN 650 MG: 325 TABLET ORAL at 21:20

## 2024-03-15 RX ADMIN — ASPIRIN 81 MG: 81 TABLET, COATED ORAL at 08:36

## 2024-03-15 RX ADMIN — ACETAMINOPHEN 650 MG: 325 TABLET ORAL at 14:37

## 2024-03-15 RX ADMIN — LINEZOLID 600 MG: 600 INJECTION, SOLUTION INTRAVENOUS at 10:12

## 2024-03-15 RX ADMIN — ACETAMINOPHEN 650 MG: 325 TABLET ORAL at 08:36

## 2024-03-15 SDOH — HEALTH STABILITY: PHYSICAL HEALTH: ON AVERAGE, HOW MANY MINUTES DO YOU ENGAGE IN EXERCISE AT THIS LEVEL?: 30 MIN

## 2024-03-15 SDOH — ECONOMIC STABILITY: INCOME INSECURITY: IN THE PAST 12 MONTHS, HAS THE ELECTRIC, GAS, OIL, OR WATER COMPANY THREATENED TO SHUT OFF SERVICE IN YOUR HOME?: NO

## 2024-03-15 SDOH — SOCIAL STABILITY: SOCIAL NETWORK
IN A TYPICAL WEEK, HOW MANY TIMES DO YOU TALK ON THE PHONE WITH FAMILY, FRIENDS, OR NEIGHBORS?: MORE THAN THREE TIMES A WEEK

## 2024-03-15 SDOH — HEALTH STABILITY: MENTAL HEALTH
STRESS IS WHEN SOMEONE FEELS TENSE, NERVOUS, ANXIOUS, OR CAN'T SLEEP AT NIGHT BECAUSE THEIR MIND IS TROUBLED. HOW STRESSED ARE YOU?: TO SOME EXTENT

## 2024-03-15 SDOH — SOCIAL STABILITY: SOCIAL INSECURITY: DO YOU FEEL UNSAFE GOING BACK TO THE PLACE WHERE YOU ARE LIVING?: NO

## 2024-03-15 SDOH — SOCIAL STABILITY: SOCIAL INSECURITY
WITHIN THE LAST YEAR, HAVE YOU BEEN KICKED, HIT, SLAPPED, OR OTHERWISE PHYSICALLY HURT BY YOUR PARTNER OR EX-PARTNER?: NO

## 2024-03-15 SDOH — SOCIAL STABILITY: SOCIAL NETWORK
DO YOU BELONG TO ANY CLUBS OR ORGANIZATIONS SUCH AS CHURCH GROUPS, UNIONS, FRATERNAL OR ATHLETIC GROUPS, OR SCHOOL GROUPS?: YES

## 2024-03-15 SDOH — SOCIAL STABILITY: SOCIAL NETWORK: ARE YOU MARRIED, WIDOWED, DIVORCED, SEPARATED, NEVER MARRIED, OR LIVING WITH A PARTNER?: MARRIED

## 2024-03-15 SDOH — SOCIAL STABILITY: SOCIAL INSECURITY: WITHIN THE LAST YEAR, HAVE YOU BEEN HUMILIATED OR EMOTIONALLY ABUSED IN OTHER WAYS BY YOUR PARTNER OR EX-PARTNER?: NO

## 2024-03-15 SDOH — HEALTH STABILITY: PHYSICAL HEALTH: ON AVERAGE, HOW MANY DAYS PER WEEK DO YOU ENGAGE IN MODERATE TO STRENUOUS EXERCISE (LIKE A BRISK WALK)?: 7 DAYS

## 2024-03-15 SDOH — SOCIAL STABILITY: SOCIAL INSECURITY
WITHIN THE LAST YEAR, HAVE TO BEEN RAPED OR FORCED TO HAVE ANY KIND OF SEXUAL ACTIVITY BY YOUR PARTNER OR EX-PARTNER?: NO

## 2024-03-15 SDOH — SOCIAL STABILITY: SOCIAL NETWORK
DO YOU BELONG TO ANY CLUBS OR ORGANIZATIONS SUCH AS CHURCH GROUPS UNIONS, FRATERNAL OR ATHLETIC GROUPS, OR SCHOOL GROUPS?: YES

## 2024-03-15 SDOH — SOCIAL STABILITY: SOCIAL NETWORK: HOW OFTEN DO YOU GET TOGETHER WITH FRIENDS OR RELATIVES?: MORE THAN THREE TIMES A WEEK

## 2024-03-15 SDOH — ECONOMIC STABILITY: INCOME INSECURITY: IN THE PAST 12 MONTHS HAS THE ELECTRIC, GAS, OIL, OR WATER COMPANY THREATENED TO SHUT OFF SERVICES IN YOUR HOME?: NO

## 2024-03-15 SDOH — ECONOMIC STABILITY: HOUSING INSECURITY: IN THE LAST 12 MONTHS, WAS THERE A TIME WHEN YOU WERE NOT ABLE TO PAY THE MORTGAGE OR RENT ON TIME?: NO

## 2024-03-15 SDOH — HEALTH STABILITY: MENTAL HEALTH: HOW OFTEN DO YOU HAVE A DRINK CONTAINING ALCOHOL?: NEVER

## 2024-03-15 SDOH — ECONOMIC STABILITY: FOOD INSECURITY: WITHIN THE PAST 12 MONTHS, YOU WORRIED THAT YOUR FOOD WOULD RUN OUT BEFORE YOU GOT MONEY TO BUY MORE.: NEVER TRUE

## 2024-03-15 SDOH — SOCIAL STABILITY: SOCIAL INSECURITY: ABUSE: ADULT

## 2024-03-15 SDOH — ECONOMIC STABILITY: FOOD INSECURITY: WITHIN THE PAST 12 MONTHS, THE FOOD YOU BOUGHT JUST DIDN'T LAST AND YOU DIDN'T HAVE MONEY TO GET MORE.: NEVER TRUE

## 2024-03-15 SDOH — SOCIAL STABILITY: SOCIAL INSECURITY: WITHIN THE LAST YEAR, HAVE YOU BEEN AFRAID OF YOUR PARTNER OR EX-PARTNER?: NO

## 2024-03-15 SDOH — ECONOMIC STABILITY: FOOD INSECURITY: HOW HARD IS IT FOR YOU TO PAY FOR THE VERY BASICS LIKE FOOD, HOUSING, MEDICAL CARE, AND HEATING?: NOT VERY HARD

## 2024-03-15 SDOH — SOCIAL STABILITY: SOCIAL INSECURITY: DO YOU FEEL ANYONE HAS EXPLOITED OR TAKEN ADVANTAGE OF YOU FINANCIALLY OR OF YOUR PERSONAL PROPERTY?: NO

## 2024-03-15 SDOH — ECONOMIC STABILITY: HOUSING INSECURITY
IN THE LAST 12 MONTHS, WAS THERE A TIME WHEN YOU DID NOT HAVE A STEADY PLACE TO SLEEP OR SLEPT IN A SHELTER (INCLUDING NOW)?: NO

## 2024-03-15 SDOH — HEALTH STABILITY: MENTAL HEALTH: HOW MANY STANDARD DRINKS CONTAINING ALCOHOL DO YOU HAVE ON A TYPICAL DAY?: PATIENT DOES NOT DRINK

## 2024-03-15 SDOH — ECONOMIC STABILITY: FOOD INSECURITY: WITHIN THE PAST 12 MONTHS, YOU WORRIED THAT YOUR FOOD WOULD RUN OUT BEFORE YOU GOT THE MONEY TO BUY MORE.: NEVER TRUE

## 2024-03-15 SDOH — ECONOMIC STABILITY: INCOME INSECURITY: IN THE LAST 12 MONTHS, WAS THERE A TIME WHEN YOU WERE NOT ABLE TO PAY THE MORTGAGE OR RENT ON TIME?: NO

## 2024-03-15 SDOH — SOCIAL STABILITY: SOCIAL INSECURITY: ARE YOU OR HAVE YOU BEEN THREATENED OR ABUSED PHYSICALLY, EMOTIONALLY, OR SEXUALLY BY ANYONE?: NO

## 2024-03-15 SDOH — ECONOMIC STABILITY: INCOME INSECURITY: HOW HARD IS IT FOR YOU TO PAY FOR THE VERY BASICS LIKE FOOD, HOUSING, MEDICAL CARE, AND HEATING?: NOT VERY HARD

## 2024-03-15 SDOH — HEALTH STABILITY: MENTAL HEALTH: HOW OFTEN DO YOU HAVE SIX OR MORE DRINKS ON ONE OCCASION?: NEVER

## 2024-03-15 SDOH — HEALTH STABILITY: MENTAL HEALTH
DO YOU FEEL STRESS - TENSE, RESTLESS, NERVOUS, OR ANXIOUS, OR UNABLE TO SLEEP AT NIGHT BECAUSE YOUR MIND IS TROUBLED ALL THE TIME - THESE DAYS?: TO SOME EXTENT

## 2024-03-15 SDOH — SOCIAL STABILITY: SOCIAL INSECURITY: HAVE YOU HAD THOUGHTS OF HARMING ANYONE ELSE?: NO

## 2024-03-15 SDOH — HEALTH STABILITY: MENTAL HEALTH: HOW OFTEN DO YOU HAVE 6 OR MORE DRINKS ON ONE OCCASION?: NEVER

## 2024-03-15 SDOH — HEALTH STABILITY: MENTAL HEALTH: HOW MANY DRINKS CONTAINING ALCOHOL DO YOU HAVE ON A TYPICAL DAY WHEN YOU ARE DRINKING?: PATIENT DOES NOT DRINK

## 2024-03-15 SDOH — SOCIAL STABILITY: SOCIAL INSECURITY: ARE YOU MARRIED, WIDOWED, DIVORCED, SEPARATED, NEVER MARRIED, OR LIVING WITH A PARTNER?: MARRIED

## 2024-03-15 SDOH — SOCIAL STABILITY: SOCIAL INSECURITY: HAS ANYONE EVER THREATENED TO HURT YOUR FAMILY OR YOUR PETS?: NO

## 2024-03-15 SDOH — ECONOMIC STABILITY: TRANSPORTATION INSECURITY
IN THE PAST 12 MONTHS, HAS THE LACK OF TRANSPORTATION KEPT YOU FROM MEDICAL APPOINTMENTS OR FROM GETTING MEDICATIONS?: NO

## 2024-03-15 SDOH — SOCIAL STABILITY: SOCIAL INSECURITY: WERE YOU ABLE TO COMPLETE ALL THE BEHAVIORAL HEALTH SCREENINGS?: YES

## 2024-03-15 SDOH — SOCIAL STABILITY: SOCIAL NETWORK: HOW OFTEN DO YOU ATTEND CHURCH OR RELIGIOUS SERVICES?: NEVER

## 2024-03-15 SDOH — SOCIAL STABILITY: SOCIAL INSECURITY
WITHIN THE LAST YEAR, HAVE YOU BEEN RAPED OR FORCED TO HAVE ANY KIND OF SEXUAL ACTIVITY BY YOUR PARTNER OR EX-PARTNER?: NO

## 2024-03-15 SDOH — SOCIAL STABILITY: SOCIAL NETWORK: HOW OFTEN DO YOU ATTEND MEETINGS OF THE CLUBS OR ORGANIZATIONS YOU BELONG TO?: NEVER

## 2024-03-15 SDOH — SOCIAL STABILITY: SOCIAL NETWORK: HOW OFTEN DO YOU ATTENT MEETINGS OF THE CLUB OR ORGANIZATION YOU BELONG TO?: NEVER

## 2024-03-15 SDOH — ECONOMIC STABILITY: TRANSPORTATION INSECURITY
IN THE PAST 12 MONTHS, HAS LACK OF TRANSPORTATION KEPT YOU FROM MEETINGS, WORK, OR FROM GETTING THINGS NEEDED FOR DAILY LIVING?: NO

## 2024-03-15 SDOH — SOCIAL STABILITY: SOCIAL INSECURITY: ARE THERE ANY APPARENT SIGNS OF INJURIES/BEHAVIORS THAT COULD BE RELATED TO ABUSE/NEGLECT?: NO

## 2024-03-15 SDOH — SOCIAL STABILITY: SOCIAL INSECURITY: DOES ANYONE TRY TO KEEP YOU FROM HAVING/CONTACTING OTHER FRIENDS OR DOING THINGS OUTSIDE YOUR HOME?: NO

## 2024-03-15 SDOH — ECONOMIC STABILITY: TRANSPORTATION INSECURITY: IN THE PAST 12 MONTHS, HAS LACK OF TRANSPORTATION KEPT YOU FROM MEDICAL APPOINTMENTS OR FROM GETTING MEDICATIONS?: NO

## 2024-03-15 ASSESSMENT — COGNITIVE AND FUNCTIONAL STATUS - GENERAL
MOBILITY SCORE: 24
PATIENT BASELINE BEDBOUND: NO
MOBILITY SCORE: 24
MOBILITY SCORE: 24
DAILY ACTIVITIY SCORE: 24
DAILY ACTIVITIY SCORE: 24

## 2024-03-15 ASSESSMENT — ACTIVITIES OF DAILY LIVING (ADL)
HEARING - RIGHT EAR: FUNCTIONAL
GROOMING: INDEPENDENT
FEEDING YOURSELF: INDEPENDENT
WALKS IN HOME: INDEPENDENT
LACK_OF_TRANSPORTATION: NO
ADEQUATE_TO_COMPLETE_ADL: YES
DRESSING YOURSELF: INDEPENDENT
HEARING - LEFT EAR: FUNCTIONAL
JUDGMENT_ADEQUATE_SAFELY_COMPLETE_DAILY_ACTIVITIES: YES
LACK_OF_TRANSPORTATION: PATIENT DECLINED
BATHING: INDEPENDENT
TOILETING: INDEPENDENT
PATIENT'S MEMORY ADEQUATE TO SAFELY COMPLETE DAILY ACTIVITIES?: YES

## 2024-03-15 ASSESSMENT — PATIENT HEALTH QUESTIONNAIRE - PHQ9
1. LITTLE INTEREST OR PLEASURE IN DOING THINGS: NOT AT ALL
SUM OF ALL RESPONSES TO PHQ9 QUESTIONS 1 & 2: 0
2. FEELING DOWN, DEPRESSED OR HOPELESS: NOT AT ALL

## 2024-03-15 ASSESSMENT — LIFESTYLE VARIABLES
SKIP TO QUESTIONS 9-10: 1
AUDIT-C TOTAL SCORE: 0
SKIP TO QUESTIONS 9-10: 1
AUDIT-C TOTAL SCORE: 0
HOW OFTEN DO YOU HAVE 6 OR MORE DRINKS ON ONE OCCASION: NEVER
AUDIT-C TOTAL SCORE: 0
HOW MANY STANDARD DRINKS CONTAINING ALCOHOL DO YOU HAVE ON A TYPICAL DAY: PATIENT DOES NOT DRINK
HOW OFTEN DO YOU HAVE A DRINK CONTAINING ALCOHOL: NEVER

## 2024-03-15 ASSESSMENT — PAIN SCALES - GENERAL
PAINLEVEL_OUTOF10: 3
PAINLEVEL_OUTOF10: 0 - NO PAIN
PAINLEVEL_OUTOF10: 0 - NO PAIN
PAINLEVEL_OUTOF10: 4

## 2024-03-15 ASSESSMENT — PAIN - FUNCTIONAL ASSESSMENT
PAIN_FUNCTIONAL_ASSESSMENT: 0-10

## 2024-03-15 NOTE — PROGRESS NOTES
Regina Flaherty is a 63 y.o. female on day 1 of admission presenting with Cellulitis of right lower extremity.      Subjective   Patient seen and examined.  Appears irritable, states she is anxious about cost of medical care/plan of care.  Denies chest pain, shortness of breath, nausea vomiting diarrhea.  Denies chills.  Is in no acute distress.       Objective     Last Recorded Vitals  BP (!) 142/98 (BP Location: Left arm, Patient Position: Lying) Comment: Nurse Angelique aware  Pulse 58   Temp 36.5 °C (97.7 °F) (Oral)   Resp 16   Wt 77.1 kg (170 lb)   SpO2 99%   Intake/Output last 3 Shifts:    Intake/Output Summary (Last 24 hours) at 3/15/2024 1031  Last data filed at 3/15/2024 0900  Gross per 24 hour   Intake 720 ml   Output --   Net 720 ml       Admission Weight  Weight: 77.1 kg (170 lb) (03/14/24 1410)    Daily Weight  03/14/24 : 77.1 kg (170 lb)    Image Results  XR foot right 3+ views  Narrative: Interpreted By:  Slava Masters,   STUDY:  XR FOOT RIGHT 3+ VIEWS; 3/14/2024 5:11 pm      INDICATION:  Signs/Symptoms:pain and possible abscess. Pain      COMPARISON:  03/05/2024      ACCESSION NUMBER(S):  [id number]      ORDERING CLINICIAN:  DAV MCCORD      TECHNIQUE:  Views: Right foot AP, Lat, Oblique      FINDINGS:  No evidence for acute fracture or dislocation. Prior transmetatarsal  amputation of the distal aspects of the diaphysis of all metatarsals.  No definite bony erosion to suggest osteomyelitis. No subcutaneous  gas is seen.      Impression: No evidence for acute osseous abnormality. No bony erosion to suggest  osteomyelitis. No subcutaneous gas is seen.      Signed by: Slava Masters 3/14/2024 5:28 PM  Dictation workstation:   [id number]      Physical Exam  Vitals and nursing note reviewed.   Constitutional:       Appearance: Normal appearance. She is normal weight.   HENT:      Head: Normocephalic and atraumatic.      Mouth/Throat:      Mouth: Mucous membranes are moist.   Eyes:       Extraocular Movements: Extraocular movements intact.      Pupils: Pupils are equal, round, and reactive to light.   Cardiovascular:      Rate and Rhythm: Normal rate.      Pulses: Normal pulses.      Heart sounds: Normal heart sounds.   Pulmonary:      Effort: Pulmonary effort is normal.      Breath sounds: Normal breath sounds.   Abdominal:      General: Bowel sounds are normal.      Palpations: Abdomen is soft.   Musculoskeletal:         General: Deformity present. Normal range of motion.      Cervical back: Normal range of motion and neck supple.   Skin:     General: Skin is warm and dry.      Capillary Refill: Capillary refill takes less than 2 seconds.      Findings: Erythema present.      Comments: Erythema over the dorsum of the right foot,   Open Ulcer posterior aspect of left foot,      Neurological:      General: No focal deficit present.      Mental Status: She is alert and oriented to person, place, and time.   Psychiatric:         Behavior: Behavior normal.         Relevant Results               Assessment/Plan        Principal Problem:    Cellulitis of right lower extremity  Active Problems:    Cellulitis and abscess of foot    Cellulitis and abscess of right lower extremity  Left lower extremity ulcer  Patient has multiple allergies.  Will initiate IV Zyvox  Consult with infectious disease, podiatry.   Patient follows with Dr. Magallon  Consult wound care nurse  Wound culture from March 5, 2024, January 2024, positive for MSSA  Monitor CBC. Currently afebrile, without leukocytosis  X-ray negative for osteomyelitis or gas  Blood cultures pending  As needed acetaminophen, as needed pain management     History of diabetes  Patient states she does not have diabetes anymore.  Hemoglobin A1C 5.8     Altered gait  Apparently declining PT OT  Falls precautions     DVT/GI prophylaxis  Heparin subcu, PPI              Shira Pan, APRN-CNP

## 2024-03-15 NOTE — CONSULTS
PODIATRY CONSULT NOTE    SERVICE DATE: 3/15/2024   SERVICE TIME:  2:24 PM    REASON FOR CONSULT: Right Foot Abscess/Cellulitis and bilateral foot wounds  REQUESTING PHYSICIAN: BRYAN Chadwick-CNP  PRIMARY CARE PHYSICIAN: Yessenia Stanford DO    Subjective   HPI:  Ms. Flaherty is a 63 y.o. female who presents to [facility], operated by [facility] for right LE cellulitis and foot wounds. 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 Patient states that her dog jumped on the right foot, and she developed redness and swelling over the lateral aspect of the dorsum of the right foot. The patient states that she had 1 area that had been opened up and was packed yesterday in the wound center, but the doctor was concerned about the other, and felt there may be a developing abscess. They insisted the patient come to the hospital for further evaluation. Patient herself states there is no nausea or vomiting. Patient states there is no fevers or chills. The patient states that she has not had any other associated symptoms. The patient basically was told to come directly to the hospital for admission.    Podiatry was consulted for management of the patient's wounds and right foot cellulitis/possible abscess. The patient saw Dr. Magallon in the [facility] [place] wound care center yesterday, and she is a frequent flier for Podiatry services. The patient is in mild distress but states she wants to get better. She denies all constitutional symptoms and has no other complaints at this time.    ROS: 10-point review of systems was performed and is otherwise negative except as noted in HPI.  PMH: Reviewed/documented below.  PSH:  Noncontributory except per HPI   FH: Reviewed and noncontributory   SOCIAL:  Reviewed/documented below.  ALLERGIES: Reviewed/documented below.  MEDS: Reviewed/documented below.  VS: Reviewed/documented below.    Past Medical History:   Diagnosis Date    Arthritis     Diabetes mellitus (CMS/[facility])      Past  Surgical History:   Procedure Laterality Date    OTHER SURGICAL HISTORY  2019     section    OTHER SURGICAL HISTORY  2019    Hysterectomy    OTHER SURGICAL HISTORY  2019    Cystocele repair    OTHER SURGICAL HISTORY  2019    Mid-urethral sling insertion    OTHER SURGICAL HISTORY  2019    Abdominal panniculectomy    OTHER SURGICAL HISTORY  2019    Foot surgery    OTHER SURGICAL HISTORY  11/15/2019    Sacrocolpopexy    OTHER SURGICAL HISTORY  11/15/2019    Rectocele repair     No family history on file.  Social History     Tobacco Use    Smoking status: Never    Smokeless tobacco: Never   Substance Use Topics    Alcohol use: Never    Drug use: Never      Medications Prior to Admission   Medication Sig Dispense Refill Last Dose    acetaminophen (Tylenol 8 HOUR) 650 mg ER tablet Take 1 tablet (650 mg) by mouth every 8 hours if needed for mild pain (1 - 3). Do not crush, chew, or split.   3/14/2024    ascorbic acid (Vitamin C) 1,000 mg tablet Take 2 tablets (2,000 mg) by mouth once daily.   3/14/2024    ascorbic acid/collagen hydr (COLLAGEN SKIN RENEWAL ORAL) Take 2,500 mg by mouth once daily.   3/14/2024    aspirin 81 mg EC tablet Take 1 tablet (81 mg) by mouth once daily.   3/14/2024    biotin 5 mg capsule Take 2 capsules (10 mg) by mouth once daily.   3/14/2024    co-enzyme Q-10 50 mg capsule Take 2 capsules by mouth once daily.   3/14/2024    cyanocobalamin (Vitamin B-12) 500 mcg tablet Take 1 tablet (500 mcg) by mouth once daily.   3/14/2024    glucosamine HCl/chondroitin snell (GLUCOSAMINE-CHONDROITIN ORAL) Take 2 tablets by mouth once daily. 1500/1200   3/14/2024    multivitamin with minerals iron-free (Centrum Silver) Take 1 tablet by mouth once daily.   3/14/2024        Medications:  Scheduled Meds: aspirin, 81 mg, oral, Daily  heparin, 5,000 Units, subcutaneous, q12h  linezolid, 600 mg, intravenous, q12h  pantoprazole, 40 mg, oral, Daily      Continuous Infusions:    PRN  Meds: PRN medications: acetaminophen, diphenhydrAMINE, LORazepam, ondansetron, oxyCODONE-acetaminophen    Allergies as of 03/14/2024 - Reviewed 03/14/2024   Allergen Reaction Noted    Vancomycin Anaphylaxis 03/14/2024    Zosyn [piperacillin-tazobactam] Anaphylaxis 03/14/2024    Doxycycline Rash 03/14/2024    Keflex [cephalexin] Rash 03/14/2024            Objective   PHYSICAL EXAM:  Physical Exam Performed:  Vitals:    03/15/24 1100   BP: 151/78   Pulse: 70   Resp: 17   Temp:    SpO2: 99%     Body mass index is 27.44 kg/m².    Patient is AOx3 and in mild distress. Patient is alert and cooperative. Sitting comfortably in bed with dressings clean, dry and intact.     Vascular: Palpable DP/PT pulses B/L. Mild non-pitting edema noted B/L. Hair growth absent B/L. CFT<5 to B/L hallux. Temperature is warm to cool from tibial tuberosity to distal digits B/L. Mild erythema and lymphatic streaking noted from right distal foot proximally to midfoot.    Musculoskeletal: Gross active and passive ROM intact to age and activity level. Moves all extremities spontaneously. Mild pain to palpation at feet B/L.     Neurological: Intact light touch sensation B/L. Pain stimuli diminished B/L. Denies any numbness, burning or tingling.    Dermatologic: Skin appears waxy B/L.  No rashes or nodules noted B/L. No hyperkeratotic tissue noted B/L. Palpable fluctuance to the right dorso-lateral foot.    Wound #1 (Right Plantar Forefoot):  Measurements: 0.2 x 0.2 x 0.4cm  Mixed wound base of fibrogranular tissue.   Mild serosanguinous drainage with fibrotic slough.   No miguel a-wound maceration.   Mild miguel a-wound erythema.   Minimal evidence of ascending cellulitis or lymphangitis.  Mild palpable fluctuance. No malodor. Mild increased warmth.   Mild probe to bone or deep structures within the wound base.   No tunneling or tracking.   No undermining. Skin edges irregular but intact.    Wound #2 (Left Plantar Forefoot):  Measurements: 0.6 x 0.8 x  0.2cm  Mixed wound base of fibrogranuar tissue.   Mild serosanguinous drainage with fibrotic slough.   Mild miguel a-wound maceration.   No miguel a-wound erythema.   No evidence of ascending cellulitis or lymphangitis.  No palpable fluctuance. No malodor. No increased warmth.   No probe to bone or deep structures within the wound base.   No tunneling or tracking.   No undermining. Skin edges irregular but intact.    LABS:   Results for orders placed or performed during the hospital encounter of 03/14/24 (from the past 24 hour(s))   CBC and Auto Differential   Result Value Ref Range    WBC 7.3 4.4 - 11.3 x10*3/uL    nRBC 0.0 0.0 - 0.0 /100 WBCs    RBC 4.02 4.00 - 5.20 x10*6/uL    Hemoglobin 12.2 12.0 - 16.0 g/dL    Hematocrit 38.0 36.0 - 46.0 %    MCV 95 80 - 100 fL    MCH 30.3 26.0 - 34.0 pg    MCHC 32.1 32.0 - 36.0 g/dL    RDW 14.2 11.5 - 14.5 %    Platelets 352 150 - 450 x10*3/uL    Neutrophils % 74.0 40.0 - 80.0 %    Immature Granulocytes %, Automated 0.4 0.0 - 0.9 %    Lymphocytes % 12.8 13.0 - 44.0 %    Monocytes % 8.0 2.0 - 10.0 %    Eosinophils % 4.5 0.0 - 6.0 %    Basophils % 0.3 0.0 - 2.0 %    Neutrophils Absolute 5.40 1.20 - 7.70 x10*3/uL    Immature Granulocytes Absolute, Automated 0.03 0.00 - 0.70 x10*3/uL    Lymphocytes Absolute 0.93 (L) 1.20 - 4.80 x10*3/uL    Monocytes Absolute 0.58 0.10 - 1.00 x10*3/uL    Eosinophils Absolute 0.33 0.00 - 0.70 x10*3/uL    Basophils Absolute 0.02 0.00 - 0.10 x10*3/uL   Comprehensive metabolic panel   Result Value Ref Range    Glucose 101 (H) 65 - 99 mg/dL    Sodium 136 133 - 145 mmol/L    Potassium 4.2 3.4 - 5.1 mmol/L    Chloride 97 97 - 107 mmol/L    Bicarbonate 26 24 - 31 mmol/L    Urea Nitrogen 16 8 - 25 mg/dL    Creatinine 0.40 0.40 - 1.60 mg/dL    eGFR >90 >60 mL/min/1.73m*2    Calcium 9.3 8.5 - 10.4 mg/dL    Albumin 4.0 3.5 - 5.0 g/dL    Alkaline Phosphatase 73 35 - 125 U/L    Total Protein 7.7 5.9 - 7.9 g/dL    AST 15 5 - 40 U/L    Bilirubin, Total <0.2 0.1 - 1.2  mg/dL    ALT 13 5 - 40 U/L    Anion Gap 13 <=19 mmol/L   Blood Culture    Specimen: Peripheral Venipuncture; Blood culture   Result Value Ref Range    Blood Culture Loaded on Instrument - Culture in progress    Blood Culture    Specimen: Peripheral Venipuncture; Blood culture   Result Value Ref Range    Blood Culture Loaded on Instrument - Culture in progress    Hemoglobin A1c   Result Value Ref Range    Hemoglobin A1C 5.8 (H) See below %    Estimated Average Glucose 120 Not Established mg/dL   Basic metabolic panel   Result Value Ref Range    Glucose 126 (H) 65 - 99 mg/dL    Sodium 144 133 - 145 mmol/L    Potassium 3.7 3.4 - 5.1 mmol/L    Chloride 106 97 - 107 mmol/L    Bicarbonate 25 24 - 31 mmol/L    Urea Nitrogen 18 8 - 25 mg/dL    Creatinine 0.40 0.40 - 1.60 mg/dL    eGFR >90 >60 mL/min/1.73m*2    Calcium 9.2 8.5 - 10.4 mg/dL    Anion Gap 13 <=19 mmol/L   CBC and Auto Differential   Result Value Ref Range    WBC 6.6 4.4 - 11.3 x10*3/uL    nRBC 0.0 0.0 - 0.0 /100 WBCs    RBC 4.01 4.00 - 5.20 x10*6/uL    Hemoglobin 12.4 12.0 - 16.0 g/dL    Hematocrit 37.8 36.0 - 46.0 %    MCV 94 80 - 100 fL    MCH 30.9 26.0 - 34.0 pg    MCHC 32.8 32.0 - 36.0 g/dL    RDW 14.2 11.5 - 14.5 %    Platelets 355 150 - 450 x10*3/uL    Neutrophils % 80.8 40.0 - 80.0 %    Immature Granulocytes %, Automated 0.3 0.0 - 0.9 %    Lymphocytes % 9.6 13.0 - 44.0 %    Monocytes % 8.7 2.0 - 10.0 %    Eosinophils % 0.3 0.0 - 6.0 %    Basophils % 0.3 0.0 - 2.0 %    Neutrophils Absolute 5.29 1.20 - 7.70 x10*3/uL    Immature Granulocytes Absolute, Automated 0.02 0.00 - 0.70 x10*3/uL    Lymphocytes Absolute 0.63 (L) 1.20 - 4.80 x10*3/uL    Monocytes Absolute 0.57 0.10 - 1.00 x10*3/uL    Eosinophils Absolute 0.02 0.00 - 0.70 x10*3/uL    Basophils Absolute 0.02 0.00 - 0.10 x10*3/uL      Lab Results   Component Value Date    HGBA1C 5.8 (H) 03/14/2024      Lab Results   Component Value Date    CRP 11.6 (H) 11/15/2020      Lab Results   Component Value Date     SEDRATE 37 (H) 11/16/2020        Results from last 7 days   Lab Units 03/15/24  0830   WBC AUTO x10*3/uL 6.6   RBC AUTO x10*6/uL 4.01   HEMOGLOBIN g/dL 12.4   HEMATOCRIT % 37.8     Results from last 7 days   Lab Units 03/15/24  0830 03/14/24  1657   SODIUM mmol/L 144 136   POTASSIUM mmol/L 3.7 4.2   CHLORIDE mmol/L 106 97   CO2 mmol/L 25 26   BUN mg/dL 18 16   CREATININE mg/dL 0.40 0.40   CALCIUM mg/dL 9.2 9.3   BILIRUBIN TOTAL mg/dL  --  <0.2   ALT U/L  --  13   AST U/L  --  15           IMAGING REVIEW:  XR foot right 3+ views    Result Date: 3/14/2024  Interpreted By:  Slava Masters, STUDY: XR FOOT RIGHT 3+ VIEWS; 3/14/2024 5:11 pm   INDICATION: Signs/Symptoms:pain and possible abscess. Pain   COMPARISON: 03/05/2024   ACCESSION NUMBER(S): [id number]   ORDERING CLINICIAN: DAV MCCORD   TECHNIQUE: Views: Right foot AP, Lat, Oblique   FINDINGS: No evidence for acute fracture or dislocation. 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 No definite bony erosion to suggest osteomyelitis. No subcutaneous gas is seen.       No evidence for acute osseous abnormality. No bony erosion to suggest osteomyelitis. No subcutaneous gas is seen.   Signed by: Slava Masters 3/14/2024 5:28 PM Dictation workstation:   [id number]    XR foot right 3+ views    Result Date: 3/5/2024  Interpreted By:  Naveen Stiles, STUDY: XR FOOT RIGHT 3+ VIEWS; ;  3/5/2024 3:29 pm   INDICATION: Signs/Symptoms:Possible osteomylitis.   COMPARISON: 12/06/2019   ACCESSION NUMBER(S): [id number]   ORDERING CLINICIAN: LENO DIEZ   FINDINGS: The mutations through the distal metatarsal bones. No new focal lytic lesion or abnormal periosteal reaction is visualized to suggest acute osteomyelitis. There is pes planus deformity. Calcaneal spur is present. There is mild diffuse soft tissue swelling.       Previous amputations through the distal metatarsal bones. Diffuse soft tissue swelling  without radiographic evidence of acute osteomyelitis. Correlate clinically and follow-up as needed.   Signed by: Naveen Stiles 3/5/2024 4:47 PM Dictation workstation:   [id number]            Assessment/Plan   ASSESSMENT & PLAN:    #Cellulitis, Right Foot  #Abscess, Right Foot  #Full Thickness Ulceration down to the level of Bone, Right Plantar Foot  #Full Thickness Ulceration down to the level of Subcutaneous Tissue, Left Plantar Foot    - Patient was seen and evaluated; all findings were discussed and all questions were answered to patient's satisfaction.  - Charts, labs, vitals and imaging all reviewed.   - Imaging: XR Right Foot is negative for any acute pathology, OM or gas. Patient deferred MRI at this time.  - Labs: Glucose 126, WBC 6.6, A1c 5.8  - Wound culture: Preliminary result 1+ rare gram positive cocci, clusters.  - Blood culture: In progress    Plan:  - Abx: IV Zyvox and Zosyn  - Pain Regimen: Per Medicine  - Bowel Regimen: Per Medicine  - Likely to require surgical intervention with debridement of soft tissue, Incision and Drainage and bone biopsy this hospital visit. Discussed this in detail with patient as well as risks and benefits. Patient is agreeable.  - Plan to add patient on to OR schedule for Monday, March 18th.   - Dressings: Changed at bedside today, 4x4 gauze, Cling and Kerlix  - Nursing staff is able to change/reinforce dressing if & as necessary until next day’s dressing change. Thank you.  - Podiatry will continue to follow while in house.    Case to be discussed with attending, A&P above reflects a tentative plan. Please await for the final signature from the attending physician on service.    Santos Gan DPM, PGY-2  Podiatric Medicine & Surgery  Please Guille message me with any questions or concerns.            SIGNATURE: Santos Gan DPM PATIENT NAME: Regina Flaherty   DATE: March 15, 2024 MRN: [id number]   TIME: 2:19 PM CONTACT: Haiku message

## 2024-03-15 NOTE — CARE PLAN
Problem: Pain  Goal: My pain/discomfort is manageable  Outcome: Progressing     Problem: Safety  Goal: Patient will be injury free during hospitalization  Outcome: Progressing  Goal: I will remain free of falls  Outcome: Progressing     Problem: Daily Care  Goal: Daily care needs are met  Outcome: Progressing     Problem: Psychosocial Needs  Goal: Demonstrates ability to cope with hospitalization/illness  Outcome: Progressing  Goal: Collaborate with me, my family, and caregiver to identify my specific goals  Outcome: Progressing     Problem: Discharge Barriers  Goal: My discharge needs are met  Outcome: Progressing     Problem: Skin  Goal: Decreased wound size/increased tissue granulation at next dressing change  Outcome: Progressing  Flowsheets (Taken 3/15/2024 0135)  Decreased wound size/increased tissue granulation at next dressing change: Protective dressings over bony prominences  Goal: Participates in plan/prevention/treatment measures  Outcome: Progressing  Flowsheets (Taken 3/15/2024 0135)  Participates in plan/prevention/treatment measures:   Elevate heels   Discuss with provider PT/OT consult  Goal: Prevent/manage excess moisture  Outcome: Progressing  Flowsheets (Taken 3/15/2024 0135)  Prevent/manage excess moisture: Moisturize dry skin  Goal: Prevent/minimize sheer/friction injuries  Outcome: Progressing  Flowsheets (Taken 3/15/2024 0135)  Prevent/minimize sheer/friction injuries:   Use pull sheet   HOB 30 degrees or less  Goal: Promote/optimize nutrition  Outcome: Progressing  Flowsheets (Taken 3/15/2024 0135)  Promote/optimize nutrition: Consume > 50% meals/supplements  Goal: Promote skin healing  Outcome: Progressing  Flowsheets (Taken 3/15/2024 0135)  Promote skin healing: Protective dressings over bony prominences

## 2024-03-15 NOTE — PROGRESS NOTES
"Physical Therapy                 Therapy Communication Note    Patient Name: Regina Flaherty  MRN: [id number]  Today's Date: 3/15/2024     Discipline: Physical Therapy    Screen only - PT attempted to see patient for evaluation. pt appeared agitated when approached. attempt at providing education regarding role of PT, pt not receptive, frequently speaking over therapist/dismissive. pt reports she has been up ad tejas and denies concerns/need for homegoing. as therapists exiting room, pt stating \"dumbasses\". will sign off.      "

## 2024-03-15 NOTE — PROGRESS NOTES
"Occupational Therapy                 Therapy Communication Note    Patient Name: Regina Flaherty  MRN: [id number]  Today's Date: 3/15/2024     Discipline: Occupational Therapy    Missed Visit Reason: Missed Visit Reason: Other (Comment) (OT order received, chart reviewed, screen completed. Pt denied acute OT needs, reports independence in ADLs and functional mobility, irritated with therapists presence, states \"dumbass\" when therapist exits room. Will sign off.)    Screen.   "

## 2024-03-15 NOTE — PROGRESS NOTES
"[facility] met with pt at bedside. Pt lives with her  and dtr in two story house with first floor setup. Pt is independent for mobility, drives. Pt does not wear glasses or hearing aids, does not have DME. Pt states she has felt some stress related to her recent medical issues and the financial toll it has taken. Pt states she has good support from family. Pt belongs to weight watchers, states she exercises throughout the week. Pt does not smoke cigarettes or drink alcohol. Pts PCP is Dr Corinne Alder through [facility], prescriptions through [facility] in [place] [facility] [address]. Discussion around dc planning with the pt stating she will not have any skilled need. [facility] made her aware care transitions will be following to see if she will need IV abx at dc or not, if she does will need to set her up with a home infusion company for delivery of supplies and abx, pt otherwise stating that she would not need homecare or \"anyone coming in my house.\"      03/15/24 0065   Discharge Planning   Patient expects to be discharged to: Home       "

## 2024-03-15 NOTE — CONSULTS
Infectious Diseases Initial Consultation    Referred by: Krista Chapman  Physician seeing patient: Ashley Espinoza  Primary MD: Yessenia Stanford DO  Reason For Consult: Foot infection    History Of Present Illness  Regina Flaherty is a pleasant, 63 y.o. female with a past medical history of type 2 DM (now resolved) with osteomyelitis s/p bilateral TMA who presented to the ED 3/14 with right foot abscess. Patient reports that her dog jumped on her and scratched her right foot about 2-3 weeks ago. Area became swollen and eventually had some drainage. She had a culture done on 3/5 with MSSA. She was given keflex at some point, but she had a rash with this and was switched to bactrim. She went to wound center and was referred to hospital 3/14 due to concern for abscess. Culture fom 3/14 with S aureus. She is currently on linezolid.      Past Medical History  She has a past medical history of Arthritis and Diabetes mellitus (CMS/[facility]).    Surgical History  She has a past surgical history that includes Other surgical history (08/14/2019); Other surgical history (08/14/2019); Other surgical history (08/14/2019); Other surgical history (08/14/2019); Other surgical history (08/14/2019); Other surgical history (08/14/2019); Other surgical history (11/15/2019); and Other surgical history (11/15/2019).     Social History  She reports that she has never smoked. She has never used smokeless tobacco. She reports that she does not drink alcohol and does not use drugs.   Travel Screening       Question Response    Do you have any of the following new or worsening symptoms? None of these    In the last 10 days, have you been in contact with someone who was confirmed or suspected to have Coronavirus/COVID-19? No / Unsure    Have you had a COVID-19 viral test in the last 10 days? No    Have you traveled internationally or domestically in the last month? No          Travel History   Travel since 02/15/24    No documented travel since  "02/15/24         Family History  No family history on file.    Allergies  Vancomycin, Zosyn [piperacillin-tazobactam], Doxycycline, and Keflex [cephalexin]   Immunization History   Administered Date(s) Administered    Moderna COVID-19 vaccine, Fall 2023, 12 yeasrs and older (50mcg/0.5mL) 10/28/2023    Moderna COVID-19 vaccine, bivalent, blue cap/gray label *Check age/dose* 10/24/2022    Moderna SARS-CoV-2 Vaccination 09/29/2021, 10/27/2021       Review of Systems  No fevers, chills, N/V/D, abd pain, SOB, chest pain, headache, sore throat, dysuria. Otherwise ROS is negative     Physical Exam  Vitals: Reviewed   General: awake, alert, no distress  Eyes: PERRL, no scleral icterus  ENT: no oral thrush, no cervical adenopathy   CV: RRR, no murmur  Resp: lungs clear to auscultation bilaterally, normal respiratory effort   Abd: soft, nontender, nondistended  Ext: small open ulcerations lateral right foot without drainage currently; open wound plantar surface left foot      Range of Vitals (last 24 hours)  Heart Rate:  [58-74]   Temp:  [36.5 °C (97.7 °F)-36.9 °C (98.4 °F)]   Resp:  [10-17]   BP: (124-151)/(58-98)   Height:  [167.6 cm (5' 6\")]   Weight:  [77.1 kg (170 lb)]   SpO2:  [95 %-99 %]     Relevant Results  Lab Results   Component Value Date    WBC 6.6 03/15/2024    HGB 12.4 03/15/2024    HCT 37.8 03/15/2024    MCV 94 03/15/2024     03/15/2024      Results from last 72 hours   Lab Units 03/15/24  0830   SODIUM mmol/L 144   POTASSIUM mmol/L 3.7   CHLORIDE mmol/L 106   CO2 mmol/L 25   BUN mg/dL 18   CREATININE mg/dL 0.40   GLUCOSE mg/dL 126*   CALCIUM mg/dL 9.2   ANION GAP mmol/L 13   EGFR mL/min/1.73m*2 >90     Results from last 72 hours   Lab Units 03/14/24  1657   ALK PHOS U/L 73   BILIRUBIN TOTAL mg/dL <0.2   PROTEIN TOTAL g/dL 7.7   ALT U/L 13   AST U/L 15   ALBUMIN g/dL 4.0     Estimated Creatinine Clearance: 125 mL/min (by C-G formula based on SCr of 0.4 mg/dL).    Cultures/Micro  3/14 wound culture + " Staph aureus  3/14 blood cultures x 2 negative to date    Imaging:  3/14 XR right foot:  No evidence for acute osseous abnormality. No bony erosion to suggest  osteomyelitis. No subcutaneous gas is seen.    Assessment:  Right foot abscess and wound infection  Extensive antibiotic allergies  Chronic left foot ulcer - not infected    Plan/Recommendations:  Continue linezolid - will change to PO  Patient tells me she is going to OR Monday. ID will follow peripherally over weekend    Ashley Doran MD  [facility]  Phone: [phone]  Fax: [phone]

## 2024-03-15 NOTE — H&P (VIEW-ONLY)
PODIATRY CONSULT NOTE    SERVICE DATE: 3/15/2024   SERVICE TIME:  2:24 PM    REASON FOR CONSULT: Right Foot Abscess/Cellulitis and bilateral foot wounds  REQUESTING PHYSICIAN: BRYAN Chadwick-CNP  PRIMARY CARE PHYSICIAN: Yessenia Stanford DO    Subjective   HPI:  Ms. Flaherty is a 63 y.o. female who presents to [facility] for right LE cellulitis and foot wounds. 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 Patient states that her dog jumped on the right foot, and she developed redness and swelling over the lateral aspect of the dorsum of the right foot. The patient states that she had 1 area that had been opened up and was packed yesterday in the wound center, but the doctor was concerned about the other, and felt there may be a developing abscess. They insisted the patient come to the hospital for further evaluation. Patient herself states there is no nausea or vomiting. Patient states there is no fevers or chills. The patient states that she has not had any other associated symptoms. The patient basically was told to come directly to the hospital for admission.    Podiatry was consulted for management of the patient's wounds and right foot cellulitis/possible abscess. The patient saw Dr. Magallon in the [facility] wound care center yesterday, and she is a frequent flier for Podiatry services. The patient is in mild distress but states she wants to get better. She denies all constitutional symptoms and has no other complaints at this time.    ROS: 10-point review of systems was performed and is otherwise negative except as noted in HPI.  PMH: Reviewed/documented below.  PSH:  Noncontributory except per HPI   FH: Reviewed and noncontributory   SOCIAL:  Reviewed/documented below.  ALLERGIES: Reviewed/documented below.  MEDS: Reviewed/documented below.  VS: Reviewed/documented below.    Past Medical History:   Diagnosis Date    Arthritis     Diabetes mellitus (CMS/[facility])      Past  Surgical History:   Procedure Laterality Date    OTHER SURGICAL HISTORY  2019     section    OTHER SURGICAL HISTORY  2019    Hysterectomy    OTHER SURGICAL HISTORY  2019    Cystocele repair    OTHER SURGICAL HISTORY  2019    Mid-urethral sling insertion    OTHER SURGICAL HISTORY  2019    Abdominal panniculectomy    OTHER SURGICAL HISTORY  2019    Foot surgery    OTHER SURGICAL HISTORY  11/15/2019    Sacrocolpopexy    OTHER SURGICAL HISTORY  11/15/2019    Rectocele repair     No family history on file.  Social History     Tobacco Use    Smoking status: Never    Smokeless tobacco: Never   Substance Use Topics    Alcohol use: Never    Drug use: Never      Medications Prior to Admission   Medication Sig Dispense Refill Last Dose    acetaminophen (Tylenol 8 HOUR) 650 mg ER tablet Take 1 tablet (650 mg) by mouth every 8 hours if needed for mild pain (1 - 3). Do not crush, chew, or split.   3/14/2024    ascorbic acid (Vitamin C) 1,000 mg tablet Take 2 tablets (2,000 mg) by mouth once daily.   3/14/2024    ascorbic acid/collagen hydr (COLLAGEN SKIN RENEWAL ORAL) Take 2,500 mg by mouth once daily.   3/14/2024    aspirin 81 mg EC tablet Take 1 tablet (81 mg) by mouth once daily.   3/14/2024    biotin 5 mg capsule Take 2 capsules (10 mg) by mouth once daily.   3/14/2024    co-enzyme Q-10 50 mg capsule Take 2 capsules by mouth once daily.   3/14/2024    cyanocobalamin (Vitamin B-12) 500 mcg tablet Take 1 tablet (500 mcg) by mouth once daily.   3/14/2024    glucosamine HCl/chondroitin snell (GLUCOSAMINE-CHONDROITIN ORAL) Take 2 tablets by mouth once daily. 1500/1200   3/14/2024    multivitamin with minerals iron-free (Centrum Silver) Take 1 tablet by mouth once daily.   3/14/2024        Medications:  Scheduled Meds: aspirin, 81 mg, oral, Daily  heparin, 5,000 Units, subcutaneous, q12h  linezolid, 600 mg, intravenous, q12h  pantoprazole, 40 mg, oral, Daily      Continuous Infusions:    PRN  Meds: PRN medications: acetaminophen, diphenhydrAMINE, LORazepam, ondansetron, oxyCODONE-acetaminophen    Allergies as of 03/14/2024 - Reviewed 03/14/2024   Allergen Reaction Noted    Vancomycin Anaphylaxis 03/14/2024    Zosyn [piperacillin-tazobactam] Anaphylaxis 03/14/2024    Doxycycline Rash 03/14/2024    Keflex [cephalexin] Rash 03/14/2024            Objective   PHYSICAL EXAM:  Physical Exam Performed:  Vitals:    03/15/24 1100   BP: 151/78   Pulse: 70   Resp: 17   Temp:    SpO2: 99%     Body mass index is 27.44 kg/m².    Patient is AOx3 and in mild distress. Patient is alert and cooperative. Sitting comfortably in bed with dressings clean, dry and intact.     Vascular: Palpable DP/PT pulses B/L. Mild non-pitting edema noted B/L. Hair growth absent B/L. CFT<5 to B/L hallux. Temperature is warm to cool from tibial tuberosity to distal digits B/L. Mild erythema and lymphatic streaking noted from right distal foot proximally to midfoot.    Musculoskeletal: Gross active and passive ROM intact to age and activity level. Moves all extremities spontaneously. Mild pain to palpation at feet B/L.     Neurological: Intact light touch sensation B/L. Pain stimuli diminished B/L. Denies any numbness, burning or tingling.    Dermatologic: Skin appears waxy B/L.  No rashes or nodules noted B/L. No hyperkeratotic tissue noted B/L. Palpable fluctuance to the right dorso-lateral foot.    Wound #1 (Right Plantar Forefoot):  Measurements: 0.2 x 0.2 x 0.4cm  Mixed wound base of fibrogranular tissue.   Mild serosanguinous drainage with fibrotic slough.   No miguel a-wound maceration.   Mild miguel a-wound erythema.   Minimal evidence of ascending cellulitis or lymphangitis.  Mild palpable fluctuance. No malodor. Mild increased warmth.   Mild probe to bone or deep structures within the wound base.   No tunneling or tracking.   No undermining. Skin edges irregular but intact.    Wound #2 (Left Plantar Forefoot):  Measurements: 0.6 x 0.8 x  0.2cm  Mixed wound base of fibrogranuar tissue.   Mild serosanguinous drainage with fibrotic slough.   Mild miguel a-wound maceration.   No miguel a-wound erythema.   No evidence of ascending cellulitis or lymphangitis.  No palpable fluctuance. No malodor. No increased warmth.   No probe to bone or deep structures within the wound base.   No tunneling or tracking.   No undermining. Skin edges irregular but intact.    LABS:   Results for orders placed or performed during the hospital encounter of 03/14/24 (from the past 24 hour(s))   CBC and Auto Differential   Result Value Ref Range    WBC 7.3 4.4 - 11.3 x10*3/uL    nRBC 0.0 0.0 - 0.0 /100 WBCs    RBC 4.02 4.00 - 5.20 x10*6/uL    Hemoglobin 12.2 12.0 - 16.0 g/dL    Hematocrit 38.0 36.0 - 46.0 %    MCV 95 80 - 100 fL    MCH 30.3 26.0 - 34.0 pg    MCHC 32.1 32.0 - 36.0 g/dL    RDW 14.2 11.5 - 14.5 %    Platelets 352 150 - 450 x10*3/uL    Neutrophils % 74.0 40.0 - 80.0 %    Immature Granulocytes %, Automated 0.4 0.0 - 0.9 %    Lymphocytes % 12.8 13.0 - 44.0 %    Monocytes % 8.0 2.0 - 10.0 %    Eosinophils % 4.5 0.0 - 6.0 %    Basophils % 0.3 0.0 - 2.0 %    Neutrophils Absolute 5.40 1.20 - 7.70 x10*3/uL    Immature Granulocytes Absolute, Automated 0.03 0.00 - 0.70 x10*3/uL    Lymphocytes Absolute 0.93 (L) 1.20 - 4.80 x10*3/uL    Monocytes Absolute 0.58 0.10 - 1.00 x10*3/uL    Eosinophils Absolute 0.33 0.00 - 0.70 x10*3/uL    Basophils Absolute 0.02 0.00 - 0.10 x10*3/uL   Comprehensive metabolic panel   Result Value Ref Range    Glucose 101 (H) 65 - 99 mg/dL    Sodium 136 133 - 145 mmol/L    Potassium 4.2 3.4 - 5.1 mmol/L    Chloride 97 97 - 107 mmol/L    Bicarbonate 26 24 - 31 mmol/L    Urea Nitrogen 16 8 - 25 mg/dL    Creatinine 0.40 0.40 - 1.60 mg/dL    eGFR >90 >60 mL/min/1.73m*2    Calcium 9.3 8.5 - 10.4 mg/dL    Albumin 4.0 3.5 - 5.0 g/dL    Alkaline Phosphatase 73 35 - 125 U/L    Total Protein 7.7 5.9 - 7.9 g/dL    AST 15 5 - 40 U/L    Bilirubin, Total <0.2 0.1 - 1.2  mg/dL    ALT 13 5 - 40 U/L    Anion Gap 13 <=19 mmol/L   Blood Culture    Specimen: Peripheral Venipuncture; Blood culture   Result Value Ref Range    Blood Culture Loaded on Instrument - Culture in progress    Blood Culture    Specimen: Peripheral Venipuncture; Blood culture   Result Value Ref Range    Blood Culture Loaded on Instrument - Culture in progress    Hemoglobin A1c   Result Value Ref Range    Hemoglobin A1C 5.8 (H) See below %    Estimated Average Glucose 120 Not Established mg/dL   Basic metabolic panel   Result Value Ref Range    Glucose 126 (H) 65 - 99 mg/dL    Sodium 144 133 - 145 mmol/L    Potassium 3.7 3.4 - 5.1 mmol/L    Chloride 106 97 - 107 mmol/L    Bicarbonate 25 24 - 31 mmol/L    Urea Nitrogen 18 8 - 25 mg/dL    Creatinine 0.40 0.40 - 1.60 mg/dL    eGFR >90 >60 mL/min/1.73m*2    Calcium 9.2 8.5 - 10.4 mg/dL    Anion Gap 13 <=19 mmol/L   CBC and Auto Differential   Result Value Ref Range    WBC 6.6 4.4 - 11.3 x10*3/uL    nRBC 0.0 0.0 - 0.0 /100 WBCs    RBC 4.01 4.00 - 5.20 x10*6/uL    Hemoglobin 12.4 12.0 - 16.0 g/dL    Hematocrit 37.8 36.0 - 46.0 %    MCV 94 80 - 100 fL    MCH 30.9 26.0 - 34.0 pg    MCHC 32.8 32.0 - 36.0 g/dL    RDW 14.2 11.5 - 14.5 %    Platelets 355 150 - 450 x10*3/uL    Neutrophils % 80.8 40.0 - 80.0 %    Immature Granulocytes %, Automated 0.3 0.0 - 0.9 %    Lymphocytes % 9.6 13.0 - 44.0 %    Monocytes % 8.7 2.0 - 10.0 %    Eosinophils % 0.3 0.0 - 6.0 %    Basophils % 0.3 0.0 - 2.0 %    Neutrophils Absolute 5.29 1.20 - 7.70 x10*3/uL    Immature Granulocytes Absolute, Automated 0.02 0.00 - 0.70 x10*3/uL    Lymphocytes Absolute 0.63 (L) 1.20 - 4.80 x10*3/uL    Monocytes Absolute 0.57 0.10 - 1.00 x10*3/uL    Eosinophils Absolute 0.02 0.00 - 0.70 x10*3/uL    Basophils Absolute 0.02 0.00 - 0.10 x10*3/uL      Lab Results   Component Value Date    HGBA1C 5.8 (H) 03/14/2024      Lab Results   Component Value Date    CRP 11.6 (H) 11/15/2020      Lab Results   Component Value Date     SEDRATE 37 (H) 11/16/2020        Results from last 7 days   Lab Units 03/15/24  0830   WBC AUTO x10*3/uL 6.6   RBC AUTO x10*6/uL 4.01   HEMOGLOBIN g/dL 12.4   HEMATOCRIT % 37.8     Results from last 7 days   Lab Units 03/15/24  0830 03/14/24  1657   SODIUM mmol/L 144 136   POTASSIUM mmol/L 3.7 4.2   CHLORIDE mmol/L 106 97   CO2 mmol/L 25 26   BUN mg/dL 18 16   CREATININE mg/dL 0.40 0.40   CALCIUM mg/dL 9.2 9.3   BILIRUBIN TOTAL mg/dL  --  <0.2   ALT U/L  --  13   AST U/L  --  15           IMAGING REVIEW:  XR foot right 3+ views    Result Date: 3/14/2024  Interpreted By:  Slava Masters, STUDY: XR FOOT RIGHT 3+ VIEWS; 3/14/2024 5:11 pm   INDICATION: Signs/Symptoms:pain and possible abscess. Pain   COMPARISON: 03/05/2024   ACCESSION NUMBER(S): [id number]   ORDERING CLINICIAN: DAV MCCORD   TECHNIQUE: Views: Right foot AP, Lat, Oblique   FINDINGS: No evidence for acute fracture or dislocation. 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 No definite bony erosion to suggest osteomyelitis. No subcutaneous gas is seen.       No evidence for acute osseous abnormality. No bony erosion to suggest osteomyelitis. No subcutaneous gas is seen.   Signed by: Slava Masetrs 3/14/2024 5:28 PM Dictation workstation:   [id number]    XR foot right 3+ views    Result Date: 3/5/2024  Interpreted By:  Naveen Stiles, STUDY: XR FOOT RIGHT 3+ VIEWS; ;  3/5/2024 3:29 pm   INDICATION: Signs/Symptoms:Possible osteomylitis.   COMPARISON: 12/06/2019   ACCESSION NUMBER(S): [id number]   ORDERING CLINICIAN: LENO DIEZ   FINDINGS: The mutations through the distal metatarsal bones. No new focal lytic lesion or abnormal periosteal reaction is visualized to suggest acute osteomyelitis. There is pes planus deformity. Calcaneal spur is present. There is mild diffuse soft tissue swelling.       Previous amputations through the distal metatarsal bones. Diffuse soft tissue swelling  without radiographic evidence of acute osteomyelitis. Correlate clinically and follow-up as needed.   Signed by: Naveen Stiels 3/5/2024 4:47 PM Dictation workstation:   [id number]            Assessment/Plan   ASSESSMENT & PLAN:    #Cellulitis, Right Foot  #Abscess, Right Foot  #Full Thickness Ulceration down to the level of Bone, Right Plantar Foot  #Full Thickness Ulceration down to the level of Subcutaneous Tissue, Left Plantar Foot    - Patient was seen and evaluated; all findings were discussed and all questions were answered to patient's satisfaction.  - Charts, labs, vitals and imaging all reviewed.   - Imaging: XR Right Foot is negative for any acute pathology, OM or gas. Patient deferred MRI at this time.  - Labs: Glucose 126, WBC 6.6, A1c 5.8  - Wound culture: Preliminary result 1+ rare gram positive cocci, clusters.  - Blood culture: In progress    Plan:  - Abx: IV Zyvox and Zosyn  - Pain Regimen: Per Medicine  - Bowel Regimen: Per Medicine  - Likely to require surgical intervention with debridement of soft tissue, Incision and Drainage and bone biopsy this hospital visit. Discussed this in detail with patient as well as risks and benefits. Patient is agreeable.  - Plan to add patient on to OR schedule for Monday, March 18th.   - Dressings: Changed at bedside today, 4x4 gauze, Cling and Kerlix  - Nursing staff is able to change/reinforce dressing if & as necessary until next day’s dressing change. Thank you.  - Podiatry will continue to follow while in house.    Case to be discussed with attending, A&P above reflects a tentative plan. Please await for the final signature from the attending physician on service.    Santos Gan DPM, PGY-2  Podiatric Medicine & Surgery  Please Guille message me with any questions or concerns.            SIGNATURE: Santos Gan DPM PATIENT NAME: Regina Flaherty   DATE: March 15, 2024 MRN: [id number]   TIME: 2:19 PM CONTACT: Haiku message

## 2024-03-16 LAB
ANION GAP SERPL CALC-SCNC: 10 MMOL/L
BACTERIA SPEC CULT: ABNORMAL
BASOPHILS # BLD AUTO: 0.05 X10*3/UL (ref 0–0.1)
BASOPHILS NFR BLD AUTO: 1 %
BUN SERPL-MCNC: 32 MG/DL (ref 8–25)
CALCIUM SERPL-MCNC: 8.3 MG/DL (ref 8.5–10.4)
CHLORIDE SERPL-SCNC: 106 MMOL/L (ref 97–107)
CO2 SERPL-SCNC: 24 MMOL/L (ref 24–31)
CREAT SERPL-MCNC: 0.6 MG/DL (ref 0.4–1.6)
EGFRCR SERPLBLD CKD-EPI 2021: >90 ML/MIN/1.73M*2
EOSINOPHIL # BLD AUTO: 0.91 X10*3/UL (ref 0–0.7)
EOSINOPHIL NFR BLD AUTO: 17.7 %
ERYTHROCYTE [DISTWIDTH] IN BLOOD BY AUTOMATED COUNT: 14.6 % (ref 11.5–14.5)
GLUCOSE SERPL-MCNC: 124 MG/DL (ref 65–99)
GRAM STN SPEC: ABNORMAL
GRAM STN SPEC: ABNORMAL
HCT VFR BLD AUTO: 35.5 % (ref 36–46)
HGB BLD-MCNC: 11.2 G/DL (ref 12–16)
IMM GRANULOCYTES # BLD AUTO: 0.03 X10*3/UL (ref 0–0.7)
IMM GRANULOCYTES NFR BLD AUTO: 0.6 % (ref 0–0.9)
LYMPHOCYTES # BLD AUTO: 1.07 X10*3/UL (ref 1.2–4.8)
LYMPHOCYTES NFR BLD AUTO: 20.8 %
MCH RBC QN AUTO: 29.9 PG (ref 26–34)
MCHC RBC AUTO-ENTMCNC: 31.5 G/DL (ref 32–36)
MCV RBC AUTO: 95 FL (ref 80–100)
MONOCYTES # BLD AUTO: 0.46 X10*3/UL (ref 0.1–1)
MONOCYTES NFR BLD AUTO: 8.9 %
NEUTROPHILS # BLD AUTO: 2.63 X10*3/UL (ref 1.2–7.7)
NEUTROPHILS NFR BLD AUTO: 51 %
NRBC BLD-RTO: 0 /100 WBCS (ref 0–0)
PLATELET # BLD AUTO: 318 X10*3/UL (ref 150–450)
POTASSIUM SERPL-SCNC: 4.1 MMOL/L (ref 3.4–5.1)
RBC # BLD AUTO: 3.75 X10*6/UL (ref 4–5.2)
SODIUM SERPL-SCNC: 140 MMOL/L (ref 133–145)
WBC # BLD AUTO: 5.2 X10*3/UL (ref 4.4–11.3)

## 2024-03-16 PROCEDURE — 2500000001 HC RX 250 WO HCPCS SELF ADMINISTERED DRUGS (ALT 637 FOR MEDICARE OP): Performed by: INTERNAL MEDICINE

## 2024-03-16 PROCEDURE — 1210000001 HC SEMI-PRIVATE ROOM DAILY

## 2024-03-16 PROCEDURE — 2500000001 HC RX 250 WO HCPCS SELF ADMINISTERED DRUGS (ALT 637 FOR MEDICARE OP)

## 2024-03-16 PROCEDURE — 80048 BASIC METABOLIC PNL TOTAL CA: CPT

## 2024-03-16 PROCEDURE — 36415 COLL VENOUS BLD VENIPUNCTURE: CPT

## 2024-03-16 PROCEDURE — 85025 COMPLETE CBC W/AUTO DIFF WBC: CPT

## 2024-03-16 RX ORDER — POLYETHYLENE GLYCOL 3350 17 G/17G
17 POWDER, FOR SOLUTION ORAL DAILY PRN
Status: DISCONTINUED | OUTPATIENT
Start: 2024-03-16 | End: 2024-03-20 | Stop reason: HOSPADM

## 2024-03-16 RX ADMIN — LINEZOLID 600 MG: 600 TABLET, FILM COATED ORAL at 21:15

## 2024-03-16 RX ADMIN — ACETAMINOPHEN 650 MG: 325 TABLET ORAL at 10:15

## 2024-03-16 RX ADMIN — LINEZOLID 600 MG: 600 TABLET, FILM COATED ORAL at 10:17

## 2024-03-16 RX ADMIN — ASPIRIN 81 MG: 81 TABLET, COATED ORAL at 10:17

## 2024-03-16 ASSESSMENT — COGNITIVE AND FUNCTIONAL STATUS - GENERAL
DAILY ACTIVITIY SCORE: 24
MOBILITY SCORE: 23
CLIMB 3 TO 5 STEPS WITH RAILING: A LITTLE

## 2024-03-16 ASSESSMENT — PAIN - FUNCTIONAL ASSESSMENT
PAIN_FUNCTIONAL_ASSESSMENT: 0-10

## 2024-03-16 ASSESSMENT — PAIN DESCRIPTION - LOCATION: LOCATION: BACK

## 2024-03-16 ASSESSMENT — PAIN SCALES - GENERAL
PAINLEVEL_OUTOF10: 0 - NO PAIN
PAINLEVEL_OUTOF10: 0 - NO PAIN
PAINLEVEL_OUTOF10: 4

## 2024-03-16 ASSESSMENT — PAIN DESCRIPTION - ORIENTATION: ORIENTATION: LOWER

## 2024-03-16 NOTE — CARE PLAN
The patient's goals for the shift include      The clinical goals for the shift include Pt will remain safe and free from harm throughout the shift    Problem: Pain  Goal: My pain/discomfort is manageable  Outcome: Progressing     Problem: Safety  Goal: Patient will be injury free during hospitalization  Outcome: Progressing  Goal: I will remain free of falls  Outcome: Progressing     Problem: Daily Care  Goal: Daily care needs are met  Outcome: Progressing     Problem: Psychosocial Needs  Goal: Demonstrates ability to cope with hospitalization/illness  Outcome: Progressing  Goal: Collaborate with me, my family, and caregiver to identify my specific goals  Outcome: Progressing     Problem: Discharge Barriers  Goal: My discharge needs are met  Outcome: Progressing     Problem: Skin  Goal: Decreased wound size/increased tissue granulation at next dressing change  Outcome: Progressing  Goal: Participates in plan/prevention/treatment measures  Outcome: Progressing  Goal: Prevent/manage excess moisture  Outcome: Progressing  Goal: Prevent/minimize sheer/friction injuries  Outcome: Progressing  Goal: Promote/optimize nutrition  Outcome: Progressing  Goal: Promote skin healing  Outcome: Progressing     Problem: Fall/Injury  Goal: Not fall by end of shift  Outcome: Progressing  Goal: Be free from injury by end of the shift  Outcome: Progressing  Goal: Verbalize understanding of personal risk factors for fall in the hospital  Outcome: Progressing  Goal: Verbalize understanding of risk factor reduction measures to prevent injury from fall in the home  Outcome: Progressing  Goal: Use assistive devices by end of the shift  Outcome: Progressing  Goal: Pace activities to prevent fatigue by end of the shift  Outcome: Progressing

## 2024-03-16 NOTE — PROGRESS NOTES
Regina Flaherty is a 63 y.o. female on day 2 of admission presenting with Cellulitis of right lower extremity.      Subjective   Patient seen and examined.  States she is feeling better today.  Denies chest pain, shortness of breath, nausea vomiting diarrhea.  Denies chills.  Is in no acute distress.       Objective     Last Recorded Vitals  /84 (BP Location: Right arm, Patient Position: Lying)   Pulse 62   Temp 37 °C (98.6 °F) (Oral)   Resp 18   Wt 77.1 kg (170 lb)   SpO2 100%   Intake/Output last 3 Shifts:    Intake/Output Summary (Last 24 hours) at 3/16/2024 1025  Last data filed at 3/15/2024 2120  Gross per 24 hour   Intake --   Output 0 ml   Net 0 ml         Admission Weight  Weight: 77.1 kg (170 lb) (03/14/24 1410)    Daily Weight  03/14/24 : 77.1 kg (170 lb)    Image Results  XR foot right 3+ views  Narrative: Interpreted By:  Slava Masters,   STUDY:  XR FOOT RIGHT 3+ VIEWS; 3/14/2024 5:11 pm      INDICATION:  Signs/Symptoms:pain and possible abscess. Pain      COMPARISON:  03/05/2024      ACCESSION NUMBER(S):  [id number]      ORDERING CLINICIAN:  DAV MCCORD      TECHNIQUE:  Views: Right foot AP, Lat, Oblique      FINDINGS:  No evidence for acute fracture or dislocation. Prior transmetatarsal  amputation of the distal aspects of the diaphysis of all metatarsals.  No definite bony erosion to suggest osteomyelitis. No subcutaneous  gas is seen.      Impression: No evidence for acute osseous abnormality. No bony erosion to suggest  osteomyelitis. No subcutaneous gas is seen.      Signed by: Slava Masters 3/14/2024 5:28 PM  Dictation workstation:   [id number]      Physical Exam  Vitals and nursing note reviewed.   Constitutional:       Appearance: Normal appearance. She is normal weight.   HENT:      Head: Normocephalic and atraumatic.      Mouth/Throat:      Mouth: Mucous membranes are moist.   Eyes:      Extraocular Movements: Extraocular movements intact.      Pupils: Pupils are  equal, round, and reactive to light.   Cardiovascular:      Rate and Rhythm: Normal rate.      Pulses: Normal pulses.      Heart sounds: Normal heart sounds.   Pulmonary:      Effort: Pulmonary effort is normal.      Breath sounds: Normal breath sounds.   Abdominal:      General: Bowel sounds are normal.      Palpations: Abdomen is soft.   Musculoskeletal:         General: Deformity present. Normal range of motion.      Cervical back: Normal range of motion and neck supple.      Comments: Status post bilateral transmetatarsal amputation   Skin:     General: Skin is warm and dry.      Capillary Refill: Capillary refill takes less than 2 seconds.      Comments: Trace edema over the dorsum of the right foot,   Open Ulcer posterior aspect of left foot, chronic, no signs of infection     Neurological:      General: No focal deficit present.      Mental Status: She is alert and oriented to person, place, and time.   Psychiatric:         Mood and Affect: Mood normal.         Behavior: Behavior normal.         Relevant Results               Assessment/Plan        Principal Problem:    Cellulitis of right lower extremity  Active Problems:    Cellulitis and abscess of foot    Cellulitis and abscess of right lower extremity  Left lower extremity ulcer  Patient has multiple allergies.  Will initiate IV Zyvox  Consult with infectious disease, podiatry.   Patient follows with Dr. Magallon  Consult wound care nurse  Wound culture from March 5, 2024, January 2024, positive for MSSA  Monitor CBC. Currently afebrile, without leukocytosis  X-ray negative for osteomyelitis or gas  Blood cultures negative to date  As needed acetaminophen, as needed pain management     History of diabetes  Patient states she does not have diabetes anymore.  Hemoglobin A1C 5.8     Altered gait  Apparently declining PT OT  Falls precautions     DVT/GI prophylaxis  Heparin subcu, PPI    3/16/2024: Clinically improving.  Podiatry has scheduled patient for  debridement, I&D, bone biopsy for Monday 3/18/2024.  IV  Zyvox has been switched to p.o.            Shira Pan, APRN-CNP

## 2024-03-16 NOTE — PROGRESS NOTES
PODIATRY PROGRESS NOTE    SERVICE DATE: 3/16/2024   SERVICE TIME:  10:38 AM    REASON FOR CONSULT: Right Foot Abscess/Cellulitis and bilateral foot wounds  REQUESTING PHYSICIAN: BRYAN Chadwick-CNP  PRIMARY CARE PHYSICIAN: Yessenia Stanford DO    Subjective   HPI:  Patient sitting comfortably at bedside this morning. She is in good spirits. The patient relates no pain at this time. Dressings are intact. She denies all constitutional symptoms. No other complaints.    Past Medical History:   Diagnosis Date    Arthritis     Diabetes mellitus (CMS/HCC)      Past Surgical History:   Procedure Laterality Date    OTHER SURGICAL HISTORY  2019     section    OTHER SURGICAL HISTORY  2019    Hysterectomy    OTHER SURGICAL HISTORY  2019    Cystocele repair    OTHER SURGICAL HISTORY  2019    Mid-urethral sling insertion    OTHER SURGICAL HISTORY  2019    Abdominal panniculectomy    OTHER SURGICAL HISTORY  2019    Foot surgery    OTHER SURGICAL HISTORY  11/15/2019    Sacrocolpopexy    OTHER SURGICAL HISTORY  11/15/2019    Rectocele repair     No family history on file.  Social History     Tobacco Use    Smoking status: Never    Smokeless tobacco: Never   Substance Use Topics    Alcohol use: Never    Drug use: Never      Medications Prior to Admission   Medication Sig Dispense Refill Last Dose    acetaminophen (Tylenol 8 HOUR) 650 mg ER tablet Take 1 tablet (650 mg) by mouth every 8 hours if needed for mild pain (1 - 3). Do not crush, chew, or split.   3/14/2024    ascorbic acid (Vitamin C) 1,000 mg tablet Take 2 tablets (2,000 mg) by mouth once daily.   3/14/2024    ascorbic acid/collagen hydr (COLLAGEN SKIN RENEWAL ORAL) Take 2,500 mg by mouth once daily.   3/14/2024    aspirin 81 mg EC tablet Take 1 tablet (81 mg) by mouth once daily.   3/14/2024    biotin 5 mg capsule Take 2 capsules (10 mg) by mouth once daily.   3/14/2024    co-enzyme Q-10 50 mg capsule Take 2 capsules by mouth  once daily.   3/14/2024    cyanocobalamin (Vitamin B-12) 500 mcg tablet Take 1 tablet (500 mcg) by mouth once daily.   3/14/2024    glucosamine HCl/chondroitin snell (GLUCOSAMINE-CHONDROITIN ORAL) Take 2 tablets by mouth once daily. 1500/1200   3/14/2024    multivitamin with minerals iron-free (Centrum Silver) Take 1 tablet by mouth once daily.   3/14/2024        Medications:  Scheduled Meds: aspirin, 81 mg, oral, Daily  heparin, 5,000 Units, subcutaneous, q12h  linezolid, 600 mg, oral, q12h GIGI  pantoprazole, 40 mg, oral, Daily      Continuous Infusions:    PRN Meds: PRN medications: acetaminophen, diphenhydrAMINE, LORazepam, ondansetron, oxyCODONE-acetaminophen    Allergies as of 03/14/2024 - Reviewed 03/14/2024   Allergen Reaction Noted    Vancomycin Anaphylaxis 03/14/2024    Zosyn [piperacillin-tazobactam] Anaphylaxis 03/14/2024    Doxycycline Rash 03/14/2024    Keflex [cephalexin] Rash 03/14/2024            Objective   PHYSICAL EXAM:  Physical Exam Performed:  Vitals:    03/16/24 0802   BP: 139/84   Pulse: 62   Resp: 18   Temp: 37 °C (98.6 °F)   SpO2: 100%     Body mass index is 27.44 kg/m².    Patient is AOx3 and in mild distress. Patient is alert and cooperative. Sitting comfortably in bed with dressings clean, dry and intact.     Vascular: Palpable DP/PT pulses B/L. Mild non-pitting edema noted B/L. Hair growth absent B/L. CFT<5 to B/L hallux. Temperature is warm to cool from tibial tuberosity to distal digits B/L. Mild erythema and lymphatic streaking noted from right distal foot proximally to midfoot, which continues to improve from yesterday's examination.    Musculoskeletal: Gross active and passive ROM intact to age and activity level. Moves all extremities spontaneously. Mild pain to palpation at feet B/L.     Neurological: Intact light touch sensation B/L. Pain stimuli diminished B/L. Denies any numbness, burning or tingling.    Dermatologic: Skin appears waxy B/L.  No rashes or nodules noted B/L. No  hyperkeratotic tissue noted B/L. Palpable fluctuance to the right dorso-lateral foot.    Wound #1 (Right Plantar Forefoot):  Measurements: 0.2 x 0.2 x 0.4cm  Mixed wound base of fibrogranular tissue.   Mild serosanguinous drainage with fibrotic slough.   No miguel a-wound maceration.   Mild miguel a-wound erythema.   Minimal evidence of ascending cellulitis or lymphangitis.  Mild palpable fluctuance. No malodor. Mild increased warmth.   Mild probe to bone or deep structures within the wound base.   No tunneling or tracking.   No undermining. Skin edges irregular but intact.    Wound #2 (Left Plantar Forefoot):  Measurements: 0.6 x 0.8 x 0.2cm  Mixed wound base of fibrogranuar tissue.   Mild serosanguinous drainage with fibrotic slough.   Mild miguel a-wound maceration.   No miguel a-wound erythema.   No evidence of ascending cellulitis or lymphangitis.  No palpable fluctuance. No malodor. No increased warmth.   No probe to bone or deep structures within the wound base.   No tunneling or tracking.   No undermining. Skin edges irregular but intact.    LABS:   Results for orders placed or performed during the hospital encounter of 03/14/24 (from the past 24 hour(s))   Basic Metabolic Panel   Result Value Ref Range    Glucose 124 (H) 65 - 99 mg/dL    Sodium 140 133 - 145 mmol/L    Potassium 4.1 3.4 - 5.1 mmol/L    Chloride 106 97 - 107 mmol/L    Bicarbonate 24 24 - 31 mmol/L    Urea Nitrogen 32 (H) 8 - 25 mg/dL    Creatinine 0.60 0.40 - 1.60 mg/dL    eGFR >90 >60 mL/min/1.73m*2    Calcium 8.3 (L) 8.5 - 10.4 mg/dL    Anion Gap 10 <=19 mmol/L   CBC and Auto Differential   Result Value Ref Range    WBC 5.2 4.4 - 11.3 x10*3/uL    nRBC 0.0 0.0 - 0.0 /100 WBCs    RBC 3.75 (L) 4.00 - 5.20 x10*6/uL    Hemoglobin 11.2 (L) 12.0 - 16.0 g/dL    Hematocrit 35.5 (L) 36.0 - 46.0 %    MCV 95 80 - 100 fL    MCH 29.9 26.0 - 34.0 pg    MCHC 31.5 (L) 32.0 - 36.0 g/dL    RDW 14.6 (H) 11.5 - 14.5 %    Platelets 318 150 - 450 x10*3/uL    Neutrophils % 51.0  40.0 - 80.0 %    Immature Granulocytes %, Automated 0.6 0.0 - 0.9 %    Lymphocytes % 20.8 13.0 - 44.0 %    Monocytes % 8.9 2.0 - 10.0 %    Eosinophils % 17.7 0.0 - 6.0 %    Basophils % 1.0 0.0 - 2.0 %    Neutrophils Absolute 2.63 1.20 - 7.70 x10*3/uL    Immature Granulocytes Absolute, Automated 0.03 0.00 - 0.70 x10*3/uL    Lymphocytes Absolute 1.07 (L) 1.20 - 4.80 x10*3/uL    Monocytes Absolute 0.46 0.10 - 1.00 x10*3/uL    Eosinophils Absolute 0.91 (H) 0.00 - 0.70 x10*3/uL    Basophils Absolute 0.05 0.00 - 0.10 x10*3/uL      Lab Results   Component Value Date    HGBA1C 5.8 (H) 03/14/2024      Lab Results   Component Value Date    CRP 11.6 (H) 11/15/2020      Lab Results   Component Value Date    SEDRATE 37 (H) 11/16/2020        Results from last 7 days   Lab Units 03/16/24  0532   WBC AUTO x10*3/uL 5.2   RBC AUTO x10*6/uL 3.75*   HEMOGLOBIN g/dL 11.2*   HEMATOCRIT % 35.5*       Results from last 7 days   Lab Units 03/16/24  0532 03/15/24  0830 03/14/24  1657   SODIUM mmol/L 140   < > 136   POTASSIUM mmol/L 4.1   < > 4.2   CHLORIDE mmol/L 106   < > 97   CO2 mmol/L 24   < > 26   BUN mg/dL 32*   < > 16   CREATININE mg/dL 0.60   < > 0.40   CALCIUM mg/dL 8.3*   < > 9.3   BILIRUBIN TOTAL mg/dL  --   --  <0.2   ALT U/L  --   --  13   AST U/L  --   --  15    < > = values in this interval not displayed.             IMAGING REVIEW:  XR foot right 3+ views    Result Date: 3/14/2024  Interpreted By:  Slava Masters, STUDY: XR FOOT RIGHT 3+ VIEWS; 3/14/2024 5:11 pm   INDICATION: Signs/Symptoms:pain and possible abscess. Pain   COMPARISON: 03/05/2024   ACCESSION NUMBER(S): [id number]   ORDERING CLINICIAN: DAV MCCORD   TECHNIQUE: Views: Right foot AP, Lat, Oblique   FINDINGS: No evidence for acute fracture or dislocation. 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 No definite bony erosion to suggest osteomyelitis. No subcutaneous gas is seen.       No evidence for acute  osseous abnormality. No bony erosion to suggest osteomyelitis. No subcutaneous gas is seen.   Signed by: Slava Masters 3/14/2024 5:28 PM Dictation workstation:   [id number]    XR foot right 3+ views    Result Date: 3/5/2024  Interpreted By:  Naveen Stiles, STUDY: XR FOOT RIGHT 3+ VIEWS; ;  3/5/2024 3:29 pm   INDICATION: Signs/Symptoms:Possible osteomylitis.   COMPARISON: 12/06/2019   ACCESSION NUMBER(S): [id number]   ORDERING CLINICIAN: LENO DIEZ   FINDINGS: The mutations through the distal metatarsal bones. No new focal lytic lesion or abnormal periosteal reaction is visualized to suggest acute osteomyelitis. There is pes planus deformity. Calcaneal spur is present. There is mild diffuse soft tissue swelling.       Previous amputations through the distal metatarsal bones. Diffuse soft tissue swelling without radiographic evidence of acute osteomyelitis. Correlate clinically and follow-up as needed.   Signed by: Naveen Stiles 3/5/2024 4:47 PM Dictation workstation:   [id number]            Assessment/Plan   ASSESSMENT & PLAN:    #Cellulitis, Right Foot  #Abscess, Right Foot  #Full Thickness Ulceration down to the level of Bone, Right Plantar Foot  #Full Thickness Ulceration down to the level of Subcutaneous Tissue, Left Plantar Foot    - Patient was seen and evaluated; all findings were discussed and all questions were answered to patient's satisfaction.  - Charts, labs, vitals and imaging all reviewed.   - Imaging: XR Right Foot is negative for any acute pathology, OM or gas. Patient deferred MRI at this time.  - Labs: Glucose 124, WBC 5.2, A1c 5.8  - Wound culture: Moderate 3+ MSSA  - Blood culture: NGTD    Plan:  - Abx: IV Zyvox and Zosyn  - Pain Regimen: Per Medicine  - Bowel Regimen: Per Medicine  - Likely to require surgical intervention with debridement of soft tissue, Incision and Drainage and bone biopsy this hospital visit. Discussed this in detail with patient as well as risks and  benefits. Patient is agreeable.  - Discussed application of graft for patient's left foot wound as well. Patient states she will think about it at this time.  - Plan to add patient on to OR schedule for Monday, March 18th.   - Dressings: Changed at bedside today, 4x4 gauze, Cling and Kerlix  - Nursing staff is able to change/reinforce dressing if & as necessary until next day’s dressing change. Thank you.  - Podiatry will continue to follow while in house.    Case to be discussed with attending, A&P above reflects a tentative plan. Please await for the final signature from the attending physician on service.    Santos Gan DPM, PGY-2  Podiatric Medicine & Surgery  Please Guille message me with any questions or concerns.            SIGNATURE: Santos Gan DPM PATIENT NAME: Regina Flaherty   DATE: March 16, 2024 MRN: [id number]   TIME: 10:38 AM CONTACT: Haikjuanis cruz

## 2024-03-17 PROCEDURE — 2500000001 HC RX 250 WO HCPCS SELF ADMINISTERED DRUGS (ALT 637 FOR MEDICARE OP)

## 2024-03-17 PROCEDURE — 2500000001 HC RX 250 WO HCPCS SELF ADMINISTERED DRUGS (ALT 637 FOR MEDICARE OP): Performed by: INTERNAL MEDICINE

## 2024-03-17 PROCEDURE — 1210000001 HC SEMI-PRIVATE ROOM DAILY

## 2024-03-17 RX ADMIN — LINEZOLID 600 MG: 600 TABLET, FILM COATED ORAL at 09:12

## 2024-03-17 RX ADMIN — ASPIRIN 81 MG: 81 TABLET, COATED ORAL at 09:09

## 2024-03-17 RX ADMIN — LINEZOLID 600 MG: 600 TABLET, FILM COATED ORAL at 20:51

## 2024-03-17 ASSESSMENT — PAIN - FUNCTIONAL ASSESSMENT: PAIN_FUNCTIONAL_ASSESSMENT: 0-10

## 2024-03-17 ASSESSMENT — COGNITIVE AND FUNCTIONAL STATUS - GENERAL
CLIMB 3 TO 5 STEPS WITH RAILING: A LITTLE
CLIMB 3 TO 5 STEPS WITH RAILING: A LITTLE
MOBILITY SCORE: 23
MOBILITY SCORE: 23
DAILY ACTIVITIY SCORE: 24
DAILY ACTIVITIY SCORE: 24

## 2024-03-17 ASSESSMENT — PAIN SCALES - GENERAL
PAINLEVEL_OUTOF10: 0 - NO PAIN
PAINLEVEL_OUTOF10: 0 - NO PAIN

## 2024-03-17 NOTE — PROGRESS NOTES
Regina Flaherty is a 63 y.o. female on day 3 of admission presenting with Cellulitis of right lower extremity.      Subjective   Patient seen and examined.  States she is feeling better today.  States her anxiety has improved now that she has been moved to a room by herself.  Denies chest pain, shortness of breath, nausea vomiting diarrhea.  Denies chills.  Is in no acute distress.       Objective     Last Recorded Vitals  /80 (BP Location: Left arm, Patient Position: Sitting)   Pulse 80   Temp 36.6 °C (97.9 °F) (Oral)   Resp 17   Wt 77.1 kg (170 lb)   SpO2 97%   Intake/Output last 3 Shifts:  No intake or output data in the 24 hours ending 03/17/24 1130      Admission Weight  Weight: 77.1 kg (170 lb) (03/14/24 1410)    Daily Weight  03/14/24 : 77.1 kg (170 lb)    Image Results  XR foot right 3+ views  Narrative: Interpreted By:  Slava Masters,   STUDY:  XR FOOT RIGHT 3+ VIEWS; 3/14/2024 5:11 pm      INDICATION:  Signs/Symptoms:pain and possible abscess. Pain      COMPARISON:  03/05/2024      ACCESSION NUMBER(S):  [id number]      ORDERING CLINICIAN:  DAV MCCORD      TECHNIQUE:  Views: Right foot AP, Lat, Oblique      FINDINGS:  No evidence for acute fracture or dislocation. Prior transmetatarsal  amputation of the distal aspects of the diaphysis of all metatarsals.  No definite bony erosion to suggest osteomyelitis. No subcutaneous  gas is seen.      Impression: No evidence for acute osseous abnormality. No bony erosion to suggest  osteomyelitis. No subcutaneous gas is seen.      Signed by: Slava Masters 3/14/2024 5:28 PM  Dictation workstation:   [id number]      Physical Exam  Vitals and nursing note reviewed.   Constitutional:       Appearance: Normal appearance. She is normal weight.   HENT:      Head: Normocephalic and atraumatic.      Mouth/Throat:      Mouth: Mucous membranes are moist.   Eyes:      Extraocular Movements: Extraocular movements intact.      Pupils: Pupils are equal,  round, and reactive to light.   Cardiovascular:      Rate and Rhythm: Normal rate.      Pulses: Normal pulses.      Heart sounds: Normal heart sounds.   Pulmonary:      Effort: Pulmonary effort is normal.      Breath sounds: Normal breath sounds.   Abdominal:      General: Bowel sounds are normal.      Palpations: Abdomen is soft.   Musculoskeletal:         General: Deformity present. Normal range of motion.      Cervical back: Normal range of motion and neck supple.      Comments: Status post bilateral transmetatarsal amputation   Skin:     General: Skin is warm and dry.      Capillary Refill: Capillary refill takes less than 2 seconds.      Comments: Right lower extremity dressing clean dry intact  Open Ulcer posterior aspect of left foot, chronic, no signs of infection, dressing clean dry intact     Neurological:      General: No focal deficit present.      Mental Status: She is alert and oriented to person, place, and time.   Psychiatric:         Mood and Affect: Mood normal.         Behavior: Behavior normal.         Relevant Results               Assessment/Plan        Principal Problem:    Cellulitis of right lower extremity  Active Problems:    Cellulitis and abscess of foot    Cellulitis and abscess of right lower extremity  Left lower extremity ulcer  Patient has multiple allergies.  IV to p.o. Zyvox  Consult with infectious disease, podiatry.   Patient follows with Dr. Magallon  Consult wound care nurse  Wound culture from March 5, 2024, January 2024, positive for MSSA  Monitor CBC. Currently afebrile, without leukocytosis  X-ray negative for osteomyelitis or gas  Blood cultures negative to date  As needed acetaminophen, as needed pain management     History of diabetes  Patient states she does not have diabetes anymore.  Hemoglobin A1C 5.8     Altered gait  Apparently declining PT OT  Falls precautions     DVT/GI prophylaxis  Heparin subcu, PPI    3/17/2024: Clinically improving.  Podiatry has scheduled  patient for debridement, I&D, bone biopsy for Monday 3/18/2024.              Shira Pan, APRN-CNP

## 2024-03-17 NOTE — NURSING NOTE
Assumed care of pt.BSSR received from RN.Pt observed lying in bed ,watching TV. Respiration regular and unlabored on room air. Pt denies any pain or discomfort at this time. Call light within reach. Bed in lowest position,locked. Will continue current plan of care and update as necessary.

## 2024-03-17 NOTE — CARE PLAN
Problem: Pain  Goal: My pain/discomfort is manageable  Outcome: Progressing     Problem: Safety  Goal: Patient will be injury free during hospitalization  Outcome: Progressing  Goal: I will remain free of falls  Outcome: Progressing     Problem: Daily Care  Goal: Daily care needs are met  Outcome: Progressing     Problem: Psychosocial Needs  Goal: Demonstrates ability to cope with hospitalization/illness  Outcome: Progressing  Goal: Collaborate with me, my family, and caregiver to identify my specific goals  Outcome: Progressing     Problem: Discharge Barriers  Goal: My discharge needs are met  Outcome: Progressing     Problem: Skin  Goal: Decreased wound size/increased tissue granulation at next dressing change  Outcome: Progressing  Flowsheets (Taken 3/16/2024 2349)  Decreased wound size/increased tissue granulation at next dressing change:   Promote sleep for wound healing   Protective dressings over bony prominences   Utilize specialty bed per algorithm  Goal: Participates in plan/prevention/treatment measures  Outcome: Progressing  Flowsheets (Taken 3/16/2024 2349)  Participates in plan/prevention/treatment measures:   Discuss with provider PT/OT consult   Increase activity/out of bed for meals   Elevate heels  Goal: Prevent/manage excess moisture  Outcome: Progressing  Flowsheets (Taken 3/16/2024 2349)  Prevent/manage excess moisture:   Cleanse incontinence/protect with barrier cream   Follow provider orders for dressing changes   Moisturize dry skin   Monitor for/manage infection if present   Use wicking fabric (obtain order)  Goal: Prevent/minimize sheer/friction injuries  Outcome: Progressing  Flowsheets (Taken 3/16/2024 2349)  Prevent/minimize sheer/friction injuries:   Complete micro-shifts as needed if patient unable. Adjust patient position to relieve pressure points, not a full turn   HOB 30 degrees or less   Increase activity/out of bed for meals   Turn/reposition every 2 hours/use  positioning/transfer devices   Use pull sheet   Utilize specialty bed per algorithm  Goal: Promote/optimize nutrition  Outcome: Progressing  Flowsheets (Taken 3/16/2024 2349)  Promote/optimize nutrition:   Assist with feeding   Consume > 50% meals/supplements   Discuss with provider if NPO > 2 days   Monitor/record intake including meals   Offer water/supplements/favorite foods   Reassess MST if dietician not consulted  Goal: Promote skin healing  Outcome: Progressing  Flowsheets (Taken 3/16/2024 2349)  Promote skin healing:   Assess skin/pad under line(s)/device(s)   Ensure correct size (line/device) and apply per  instructions   Protective dressings over bony prominences   Rotate device position/do not position patient on device   Turn/reposition every 2 hours/use positioning/transfer devices     Problem: Fall/Injury  Goal: Not fall by end of shift  Outcome: Progressing  Goal: Be free from injury by end of the shift  Outcome: Progressing  Goal: Verbalize understanding of personal risk factors for fall in the hospital  Outcome: Progressing  Goal: Verbalize understanding of risk factor reduction measures to prevent injury from fall in the home  Outcome: Progressing  Goal: Use assistive devices by end of the shift  Outcome: Progressing  Goal: Pace activities to prevent fatigue by end of the shift  Outcome: Progressing   The patient's goals for the shift include      The clinical goals for the shift include Pt will remain hemodynamically stable, free of falls, pain will be managed    Over the shift, the patient did not make progress toward the following goals. Barriers to progression include . Recommendations to address these barriers include .

## 2024-03-17 NOTE — PROGRESS NOTES
PODIATRY PROGRESS NOTE    SERVICE DATE: 3/17/2024   SERVICE TIME:  2:15 PM    REASON FOR CONSULT: Right Foot Abscess/Cellulitis and bilateral foot wounds  REQUESTING PHYSICIAN: BRYAN Chadwick-CNP  PRIMARY CARE PHYSICIAN: Yessenia Stanford DO    Subjective   HPI:  Patient sitting comfortably at bedside this morning. She is in good spirits once again. The patient relates no pain at this time. Dressings are intact. Accompanied by significant other. She is looking forward to surgery tomorrow. She denies all constitutional symptoms. No other complaints.    Past Medical History:   Diagnosis Date    Arthritis     Diabetes mellitus (CMS/HCC)      Past Surgical History:   Procedure Laterality Date    OTHER SURGICAL HISTORY  2019     section    OTHER SURGICAL HISTORY  2019    Hysterectomy    OTHER SURGICAL HISTORY  2019    Cystocele repair    OTHER SURGICAL HISTORY  2019    Mid-urethral sling insertion    OTHER SURGICAL HISTORY  2019    Abdominal panniculectomy    OTHER SURGICAL HISTORY  2019    Foot surgery    OTHER SURGICAL HISTORY  11/15/2019    Sacrocolpopexy    OTHER SURGICAL HISTORY  11/15/2019    Rectocele repair     No family history on file.  Social History     Tobacco Use    Smoking status: Never    Smokeless tobacco: Never   Substance Use Topics    Alcohol use: Never    Drug use: Never      Medications Prior to Admission   Medication Sig Dispense Refill Last Dose    acetaminophen (Tylenol 8 HOUR) 650 mg ER tablet Take 1 tablet (650 mg) by mouth every 8 hours if needed for mild pain (1 - 3). Do not crush, chew, or split.   3/14/2024    ascorbic acid (Vitamin C) 1,000 mg tablet Take 2 tablets (2,000 mg) by mouth once daily.   3/14/2024    ascorbic acid/collagen hydr (COLLAGEN SKIN RENEWAL ORAL) Take 2,500 mg by mouth once daily.   3/14/2024    aspirin 81 mg EC tablet Take 1 tablet (81 mg) by mouth once daily.   3/14/2024    biotin 5 mg capsule Take 2 capsules (10 mg) by  mouth once daily.   3/14/2024    co-enzyme Q-10 50 mg capsule Take 2 capsules by mouth once daily.   3/14/2024    cyanocobalamin (Vitamin B-12) 500 mcg tablet Take 1 tablet (500 mcg) by mouth once daily.   3/14/2024    glucosamine HCl/chondroitin snell (GLUCOSAMINE-CHONDROITIN ORAL) Take 2 tablets by mouth once daily. 1500/1200   3/14/2024    multivitamin with minerals iron-free (Centrum Silver) Take 1 tablet by mouth once daily.   3/14/2024        Medications:  Scheduled Meds: aspirin, 81 mg, oral, Daily  heparin, 5,000 Units, subcutaneous, q12h  linezolid, 600 mg, oral, q12h GIGI  pantoprazole, 40 mg, oral, Daily      Continuous Infusions:    PRN Meds: PRN medications: acetaminophen, diphenhydrAMINE, LORazepam, ondansetron, oxyCODONE-acetaminophen, polyethylene glycol    Allergies as of 03/14/2024 - Reviewed 03/14/2024   Allergen Reaction Noted    Vancomycin Anaphylaxis 03/14/2024    Zosyn [piperacillin-tazobactam] Anaphylaxis 03/14/2024    Doxycycline Rash 03/14/2024    Keflex [cephalexin] Rash 03/14/2024            Objective   PHYSICAL EXAM:  Physical Exam Performed:  Vitals:    03/17/24 0908   BP: 149/80   Pulse: 80   Resp: 17   Temp: 36.6 °C (97.9 °F)   SpO2: 97%     Body mass index is 27.44 kg/m².    Patient is AOx3 and in mild distress. Patient is alert and cooperative. Sitting comfortably in bed with dressings clean, dry and intact.     Vascular: Palpable DP/PT pulses B/L. Mild non-pitting edema noted B/L. Hair growth absent B/L. CFT<5 to B/L hallux. Temperature is warm to cool from tibial tuberosity to distal digits B/L. Mild erythema and lymphatic streaking noted from right distal foot proximally to midfoot, which continues to improve from yesterday's examination.    Musculoskeletal: Gross active and passive ROM intact to age and activity level. Moves all extremities spontaneously. Mild pain to palpation at feet B/L.     Neurological: Intact light touch sensation B/L. Pain stimuli diminished B/L. Denies any  numbness, burning or tingling.    Dermatologic: Skin appears waxy B/L.  No rashes or nodules noted B/L. No hyperkeratotic tissue noted B/L. Palpable fluctuance to the right dorso-lateral foot.    Wound #1 (Right Plantar Forefoot):  Measurements: 0.2 x 0.2 x 0.4cm  Mixed wound base of fibrogranular tissue.   Mild serosanguinous drainage with fibrotic slough.   No miguel a-wound maceration.   Mild miguel a-wound erythema.   Minimal evidence of ascending cellulitis or lymphangitis.  Mild palpable fluctuance. No malodor. Mild increased warmth.   Mild probe to bone or deep structures within the wound base.   No tunneling or tracking.   No undermining. Skin edges irregular but intact.    Wound #2 (Left Plantar Forefoot):  Measurements: 0.6 x 0.8 x 0.2cm  Mixed wound base of fibrogranuar tissue.   Mild serosanguinous drainage with fibrotic slough.   Mild miguel a-wound maceration.   No miguel a-wound erythema.   No evidence of ascending cellulitis or lymphangitis.  No palpable fluctuance. No malodor. No increased warmth.   No probe to bone or deep structures within the wound base.   No tunneling or tracking.   No undermining. Skin edges irregular but intact.    LABS:   No results found for this or any previous visit (from the past 24 hour(s)).     Lab Results   Component Value Date    HGBA1C 5.8 (H) 03/14/2024      Lab Results   Component Value Date    CRP 11.6 (H) 11/15/2020      Lab Results   Component Value Date    SEDRATE 37 (H) 11/16/2020        Results from last 7 days   Lab Units 03/16/24  0532   WBC AUTO x10*3/uL 5.2   RBC AUTO x10*6/uL 3.75*   HEMOGLOBIN g/dL 11.2*   HEMATOCRIT % 35.5*     Results from last 7 days   Lab Units 03/16/24  0532 03/15/24  0830 03/14/24  1657   SODIUM mmol/L 140   < > 136   POTASSIUM mmol/L 4.1   < > 4.2   CHLORIDE mmol/L 106   < > 97   CO2 mmol/L 24   < > 26   BUN mg/dL 32*   < > 16   CREATININE mg/dL 0.60   < > 0.40   CALCIUM mg/dL 8.3*   < > 9.3   BILIRUBIN TOTAL mg/dL  --   --  <0.2   ALT U/L  --    --  13   AST U/L  --   --  15    < > = values in this interval not displayed.           IMAGING REVIEW:  XR foot right 3+ views    Result Date: 3/14/2024  Interpreted By:  Slava Masters, STUDY: XR FOOT RIGHT 3+ VIEWS; 3/14/2024 5:11 pm   INDICATION: Signs/Symptoms:pain and possible abscess. Pain   COMPARISON: 03/05/2024   ACCESSION NUMBER(S): [id number]   ORDERING CLINICIAN: DAV MCCORD   TECHNIQUE: Views: Right foot AP, Lat, Oblique   FINDINGS: No evidence for acute fracture or dislocation. 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 No definite bony erosion to suggest osteomyelitis. No subcutaneous gas is seen.       No evidence for acute osseous abnormality. No bony erosion to suggest osteomyelitis. No subcutaneous gas is seen.   Signed by: Slava Masters 3/14/2024 5:28 PM Dictation workstation:   [id number]    XR foot right 3+ views    Result Date: 3/5/2024  Interpreted By:  Naveen Stiles, STUDY: XR FOOT RIGHT 3+ VIEWS; ;  3/5/2024 3:29 pm   INDICATION: Signs/Symptoms:Possible osteomylitis.   COMPARISON: 12/06/2019   ACCESSION NUMBER(S): [id number]   ORDERING CLINICIAN: LENO DIEZ   FINDINGS: The mutations through the distal metatarsal bones. No new focal lytic lesion or abnormal periosteal reaction is visualized to suggest acute osteomyelitis. There is pes planus deformity. Calcaneal spur is present. There is mild diffuse soft tissue swelling.       Previous amputations through the distal metatarsal bones. Diffuse soft tissue swelling without radiographic evidence of acute osteomyelitis. Correlate clinically and follow-up as needed.   Signed by: Naveen Stiles 3/5/2024 4:47 PM Dictation workstation:   [id number]            Assessment/Plan   ASSESSMENT & PLAN:    #Cellulitis, Right Foot  #Abscess, Right Foot  #Full Thickness Ulceration down to the level of Bone, Right Plantar Foot  #Full Thickness Ulceration down to the level of  Subcutaneous Tissue, Left Plantar Foot    - Patient was seen and evaluated; all findings were discussed and all questions were answered to patient's satisfaction.  - Charts, labs, vitals and imaging all reviewed.   - Imaging: XR Right Foot is negative for any acute pathology, OM or gas. Patient deferred MRI at this time.  - Labs: Glucose 124, WBC 5.2, A1c 5.8  - Wound culture: Moderate 3+ MSSA  - Blood culture: NGTD    Plan:  - Abx: IV Zyvox and Zosyn  - Pain Regimen: Per Medicine  - Bowel Regimen: Per Medicine  - Discussed surgery with patient for tomorrow, patient understands the procedure and is ready to proceed. NPO at midnight order is placed.  - Dressings: Changed at bedside today, 4x4 gauze, Cling and Kerlix  - Nursing staff is able to change/reinforce dressing if & as necessary until next day’s dressing change. Thank you.  - Podiatry will continue to follow while in house.    Case to be discussed with attending, A&P above reflects a tentative plan. Please await for the final signature from the attending physician on service.    Santos Gan DPM, PGY-2  Podiatric Medicine & Surgery  Please Guille message me with any questions or concerns.            SIGNATURE: Santos Gan DPM PATIENT NAME: Regina Flaherty   DATE: March 17, 2024 MRN: [id number]   TIME: 2:15 PM CONTACT: Haiku message

## 2024-03-17 NOTE — CARE PLAN
Problem: Pain  Goal: My pain/discomfort is manageable  Outcome: Progressing   The patient's goals for the shift include      The clinical goals for the shift include pt will remain free from falls

## 2024-03-17 NOTE — NURSING NOTE
Assumed care of pt.BSSR received from RN.Pt observed sitting up in bed ,watching TV. Respiration regular and unlabored on room air. Dressing to BLE in place,intact ,clean,dry. Pt denies any pain or discomfort at this time. Call light within reach. Bed in lowest position,locked. Will continue current plan of care and update as necessary.

## 2024-03-18 ENCOUNTER — ANESTHESIA (OUTPATIENT)
Dept: OPERATING ROOM | Facility: HOSPITAL | Age: 64
DRG: 574 | End: 2024-03-18
Payer: COMMERCIAL

## 2024-03-18 ENCOUNTER — ANESTHESIA EVENT (OUTPATIENT)
Dept: OPERATING ROOM | Facility: HOSPITAL | Age: 64
DRG: 574 | End: 2024-03-18
Payer: COMMERCIAL

## 2024-03-18 PROBLEM — M86.9 OSTEOMYELITIS OF ANKLE AND FOOT (MULTI): Status: ACTIVE | Noted: 2024-03-14

## 2024-03-18 PROBLEM — E11.9 DIABETES MELLITUS, TYPE 2 (MULTI): Status: ACTIVE | Noted: 2024-03-18

## 2024-03-18 PROBLEM — E11.9 DIABETES MELLITUS, TYPE 2 (MULTI): Status: RESOLVED | Noted: 2024-03-18 | Resolved: 2024-03-18

## 2024-03-18 LAB
ANION GAP SERPL CALC-SCNC: 10 MMOL/L
BACTERIA BLD CULT: NORMAL
BACTERIA BLD CULT: NORMAL
BASOPHILS # BLD AUTO: 0.04 X10*3/UL (ref 0–0.1)
BASOPHILS NFR BLD AUTO: 0.9 %
BUN SERPL-MCNC: 24 MG/DL (ref 8–25)
CALCIUM SERPL-MCNC: 8.7 MG/DL (ref 8.5–10.4)
CHLORIDE SERPL-SCNC: 105 MMOL/L (ref 97–107)
CO2 SERPL-SCNC: 25 MMOL/L (ref 24–31)
CREAT SERPL-MCNC: 0.5 MG/DL (ref 0.4–1.6)
EGFRCR SERPLBLD CKD-EPI 2021: >90 ML/MIN/1.73M*2
EOSINOPHIL # BLD AUTO: 0.66 X10*3/UL (ref 0–0.7)
EOSINOPHIL NFR BLD AUTO: 14.6 %
ERYTHROCYTE [DISTWIDTH] IN BLOOD BY AUTOMATED COUNT: 14.3 % (ref 11.5–14.5)
GLUCOSE SERPL-MCNC: 113 MG/DL (ref 65–99)
HCT VFR BLD AUTO: 37.7 % (ref 36–46)
HGB BLD-MCNC: 11.9 G/DL (ref 12–16)
IMM GRANULOCYTES # BLD AUTO: 0.02 X10*3/UL (ref 0–0.7)
IMM GRANULOCYTES NFR BLD AUTO: 0.4 % (ref 0–0.9)
LYMPHOCYTES # BLD AUTO: 1.24 X10*3/UL (ref 1.2–4.8)
LYMPHOCYTES NFR BLD AUTO: 27.5 %
MCH RBC QN AUTO: 30.1 PG (ref 26–34)
MCHC RBC AUTO-ENTMCNC: 31.6 G/DL (ref 32–36)
MCV RBC AUTO: 95 FL (ref 80–100)
MONOCYTES # BLD AUTO: 0.53 X10*3/UL (ref 0.1–1)
MONOCYTES NFR BLD AUTO: 11.8 %
NEUTROPHILS # BLD AUTO: 2.02 X10*3/UL (ref 1.2–7.7)
NEUTROPHILS NFR BLD AUTO: 44.8 %
NRBC BLD-RTO: 0 /100 WBCS (ref 0–0)
PLATELET # BLD AUTO: 298 X10*3/UL (ref 150–450)
POTASSIUM SERPL-SCNC: 4.1 MMOL/L (ref 3.4–5.1)
RBC # BLD AUTO: 3.96 X10*6/UL (ref 4–5.2)
SODIUM SERPL-SCNC: 140 MMOL/L (ref 133–145)
WBC # BLD AUTO: 4.5 X10*3/UL (ref 4.4–11.3)

## 2024-03-18 PROCEDURE — A20240 PR BONE BIOPSY,EXCISIONAL SUPERF: Performed by: STUDENT IN AN ORGANIZED HEALTH CARE EDUCATION/TRAINING PROGRAM

## 2024-03-18 PROCEDURE — 88305 TISSUE EXAM BY PATHOLOGIST: CPT | Performed by: PATHOLOGY

## 2024-03-18 PROCEDURE — 87070 CULTURE OTHR SPECIMN AEROBIC: CPT | Mod: WESLAB | Performed by: INTERNAL MEDICINE

## 2024-03-18 PROCEDURE — 0JBR0ZZ EXCISION OF LEFT FOOT SUBCUTANEOUS TISSUE AND FASCIA, OPEN APPROACH: ICD-10-PCS | Performed by: PODIATRIST

## 2024-03-18 PROCEDURE — 7100000002 HC RECOVERY ROOM TIME - EACH INCREMENTAL 1 MINUTE: Performed by: PODIATRIST

## 2024-03-18 PROCEDURE — 3700000002 HC GENERAL ANESTHESIA TIME - EACH INCREMENTAL 1 MINUTE: Performed by: PODIATRIST

## 2024-03-18 PROCEDURE — 85025 COMPLETE CBC W/AUTO DIFF WBC: CPT

## 2024-03-18 PROCEDURE — A20240 PR BONE BIOPSY,EXCISIONAL SUPERF: Performed by: NURSE ANESTHETIST, CERTIFIED REGISTERED

## 2024-03-18 PROCEDURE — 3600000003 HC OR TIME - INITIAL BASE CHARGE - PROCEDURE LEVEL THREE: Performed by: PODIATRIST

## 2024-03-18 PROCEDURE — A4217 STERILE WATER/SALINE, 500 ML: HCPCS | Performed by: PODIATRIST

## 2024-03-18 PROCEDURE — 2500000004 HC RX 250 GENERAL PHARMACY W/ HCPCS (ALT 636 FOR OP/ED)

## 2024-03-18 PROCEDURE — 0QBP3ZX EXCISION OF LEFT METATARSAL, PERCUTANEOUS APPROACH, DIAGNOSTIC: ICD-10-PCS | Performed by: PODIATRIST

## 2024-03-18 PROCEDURE — 2780000003 HC OR 278 NO HCPCS: Performed by: PODIATRIST

## 2024-03-18 PROCEDURE — 2500000001 HC RX 250 WO HCPCS SELF ADMINISTERED DRUGS (ALT 637 FOR MEDICARE OP): Performed by: INTERNAL MEDICINE

## 2024-03-18 PROCEDURE — 2720000007 HC OR 272 NO HCPCS: Performed by: PODIATRIST

## 2024-03-18 PROCEDURE — 0HRNXK3 REPLACEMENT OF LEFT FOOT SKIN WITH NONAUTOLOGOUS TISSUE SUBSTITUTE, FULL THICKNESS, EXTERNAL APPROACH: ICD-10-PCS | Performed by: PODIATRIST

## 2024-03-18 PROCEDURE — 2500000004 HC RX 250 GENERAL PHARMACY W/ HCPCS (ALT 636 FOR OP/ED): Performed by: PODIATRIST

## 2024-03-18 PROCEDURE — 80048 BASIC METABOLIC PNL TOTAL CA: CPT

## 2024-03-18 PROCEDURE — 88311 DECALCIFY TISSUE: CPT | Performed by: PATHOLOGY

## 2024-03-18 PROCEDURE — 88305 TISSUE EXAM BY PATHOLOGIST: CPT | Mod: TC | Performed by: INTERNAL MEDICINE

## 2024-03-18 PROCEDURE — 2500000001 HC RX 250 WO HCPCS SELF ADMINISTERED DRUGS (ALT 637 FOR MEDICARE OP)

## 2024-03-18 PROCEDURE — 3700000001 HC GENERAL ANESTHESIA TIME - INITIAL BASE CHARGE: Performed by: PODIATRIST

## 2024-03-18 PROCEDURE — 7100000001 HC RECOVERY ROOM TIME - INITIAL BASE CHARGE: Performed by: PODIATRIST

## 2024-03-18 PROCEDURE — 3600000008 HC OR TIME - EACH INCREMENTAL 1 MINUTE - PROCEDURE LEVEL THREE: Performed by: PODIATRIST

## 2024-03-18 PROCEDURE — 1210000001 HC SEMI-PRIVATE ROOM DAILY

## 2024-03-18 PROCEDURE — 0H9MXZX DRAINAGE OF RIGHT FOOT SKIN, EXTERNAL APPROACH, DIAGNOSTIC: ICD-10-PCS | Performed by: PODIATRIST

## 2024-03-18 PROCEDURE — 2500000005 HC RX 250 GENERAL PHARMACY W/O HCPCS: Performed by: PODIATRIST

## 2024-03-18 PROCEDURE — 87205 SMEAR GRAM STAIN: CPT | Mod: WESLAB | Performed by: INTERNAL MEDICINE

## 2024-03-18 PROCEDURE — 36415 COLL VENOUS BLD VENIPUNCTURE: CPT

## 2024-03-18 DEVICE — INTEGRA® MESHED BILAYER WOUND MATRIX 2 IN*2 IN (5 CM*5 CM)
Type: IMPLANTABLE DEVICE | Site: FOOT | Status: FUNCTIONAL
Brand: INTEGRA®

## 2024-03-18 RX ORDER — SODIUM CHLORIDE, SODIUM LACTATE, POTASSIUM CHLORIDE, CALCIUM CHLORIDE 600; 310; 30; 20 MG/100ML; MG/100ML; MG/100ML; MG/100ML
INJECTION, SOLUTION INTRAVENOUS CONTINUOUS PRN
Status: DISCONTINUED | OUTPATIENT
Start: 2024-03-18 | End: 2024-03-18

## 2024-03-18 RX ORDER — PROPOFOL 10 MG/ML
INJECTION, EMULSION INTRAVENOUS CONTINUOUS PRN
Status: DISCONTINUED | OUTPATIENT
Start: 2024-03-18 | End: 2024-03-18

## 2024-03-18 RX ORDER — LIDOCAINE HYDROCHLORIDE 10 MG/ML
INJECTION INFILTRATION; PERINEURAL AS NEEDED
Status: DISCONTINUED | OUTPATIENT
Start: 2024-03-18 | End: 2024-03-18 | Stop reason: HOSPADM

## 2024-03-18 RX ORDER — IPRATROPIUM BROMIDE 0.5 MG/2.5ML
500 SOLUTION RESPIRATORY (INHALATION) EVERY 30 MIN PRN
Status: DISCONTINUED | OUTPATIENT
Start: 2024-03-18 | End: 2024-03-18 | Stop reason: HOSPADM

## 2024-03-18 RX ORDER — FENTANYL CITRATE 50 UG/ML
50 INJECTION, SOLUTION INTRAMUSCULAR; INTRAVENOUS EVERY 5 MIN PRN
Status: DISCONTINUED | OUTPATIENT
Start: 2024-03-18 | End: 2024-03-18 | Stop reason: HOSPADM

## 2024-03-18 RX ORDER — MIDAZOLAM HYDROCHLORIDE 1 MG/ML
INJECTION, SOLUTION INTRAMUSCULAR; INTRAVENOUS AS NEEDED
Status: DISCONTINUED | OUTPATIENT
Start: 2024-03-18 | End: 2024-03-18

## 2024-03-18 RX ORDER — SODIUM CHLORIDE, SODIUM LACTATE, POTASSIUM CHLORIDE, CALCIUM CHLORIDE 600; 310; 30; 20 MG/100ML; MG/100ML; MG/100ML; MG/100ML
40 INJECTION, SOLUTION INTRAVENOUS CONTINUOUS
Status: DISCONTINUED | OUTPATIENT
Start: 2024-03-18 | End: 2024-03-18 | Stop reason: HOSPADM

## 2024-03-18 RX ORDER — LABETALOL HYDROCHLORIDE 5 MG/ML
5 INJECTION, SOLUTION INTRAVENOUS EVERY 5 MIN PRN
Status: DISCONTINUED | OUTPATIENT
Start: 2024-03-18 | End: 2024-03-18 | Stop reason: HOSPADM

## 2024-03-18 RX ORDER — ONDANSETRON HYDROCHLORIDE 2 MG/ML
INJECTION, SOLUTION INTRAVENOUS AS NEEDED
Status: DISCONTINUED | OUTPATIENT
Start: 2024-03-18 | End: 2024-03-18

## 2024-03-18 RX ORDER — ALBUTEROL SULFATE 0.83 MG/ML
2.5 SOLUTION RESPIRATORY (INHALATION) EVERY 30 MIN PRN
Status: DISCONTINUED | OUTPATIENT
Start: 2024-03-18 | End: 2024-03-18 | Stop reason: HOSPADM

## 2024-03-18 RX ORDER — LANOLIN ALCOHOL/MO/W.PET/CERES
50 CREAM (GRAM) TOPICAL DAILY
Status: DISCONTINUED | OUTPATIENT
Start: 2024-03-18 | End: 2024-03-20 | Stop reason: HOSPADM

## 2024-03-18 RX ORDER — PROPOFOL 10 MG/ML
INJECTION, EMULSION INTRAVENOUS AS NEEDED
Status: DISCONTINUED | OUTPATIENT
Start: 2024-03-18 | End: 2024-03-18

## 2024-03-18 RX ORDER — BUPIVACAINE HYDROCHLORIDE 5 MG/ML
INJECTION, SOLUTION PERINEURAL AS NEEDED
Status: DISCONTINUED | OUTPATIENT
Start: 2024-03-18 | End: 2024-03-18 | Stop reason: HOSPADM

## 2024-03-18 RX ORDER — ONDANSETRON HYDROCHLORIDE 2 MG/ML
4 INJECTION, SOLUTION INTRAVENOUS ONCE AS NEEDED
Status: DISCONTINUED | OUTPATIENT
Start: 2024-03-18 | End: 2024-03-18 | Stop reason: HOSPADM

## 2024-03-18 RX ORDER — SODIUM CHLORIDE 0.9 G/100ML
IRRIGANT IRRIGATION AS NEEDED
Status: DISCONTINUED | OUTPATIENT
Start: 2024-03-18 | End: 2024-03-18 | Stop reason: HOSPADM

## 2024-03-18 RX ORDER — FENTANYL CITRATE 50 UG/ML
INJECTION, SOLUTION INTRAMUSCULAR; INTRAVENOUS AS NEEDED
Status: DISCONTINUED | OUTPATIENT
Start: 2024-03-18 | End: 2024-03-18

## 2024-03-18 RX ADMIN — MIDAZOLAM 1 MG: 1 INJECTION INTRAMUSCULAR; INTRAVENOUS at 11:07

## 2024-03-18 RX ADMIN — LINEZOLID 600 MG: 600 TABLET, FILM COATED ORAL at 22:33

## 2024-03-18 RX ADMIN — OXYCODONE HYDROCHLORIDE AND ACETAMINOPHEN 1 TABLET: 5; 325 TABLET ORAL at 22:33

## 2024-03-18 RX ADMIN — PROPOFOL 20 MG: 10 INJECTION, EMULSION INTRAVENOUS at 11:10

## 2024-03-18 RX ADMIN — PROPOFOL 100 MCG/KG/MIN: 10 INJECTION, EMULSION INTRAVENOUS at 11:09

## 2024-03-18 RX ADMIN — FENTANYL CITRATE 50 MCG: 50 INJECTION, SOLUTION INTRAMUSCULAR; INTRAVENOUS at 11:01

## 2024-03-18 RX ADMIN — LINEZOLID 600 MG: 600 TABLET, FILM COATED ORAL at 09:00

## 2024-03-18 RX ADMIN — SODIUM CHLORIDE, POTASSIUM CHLORIDE, SODIUM LACTATE AND CALCIUM CHLORIDE: 600; 310; 30; 20 INJECTION, SOLUTION INTRAVENOUS at 11:02

## 2024-03-18 RX ADMIN — ONDANSETRON HYDROCHLORIDE 4 MG: 2 INJECTION INTRAMUSCULAR; INTRAVENOUS at 11:28

## 2024-03-18 RX ADMIN — FENTANYL CITRATE 50 MCG: 50 INJECTION, SOLUTION INTRAMUSCULAR; INTRAVENOUS at 11:10

## 2024-03-18 RX ADMIN — MIDAZOLAM 1 MG: 1 INJECTION INTRAMUSCULAR; INTRAVENOUS at 11:01

## 2024-03-18 RX ADMIN — MIDAZOLAM 1 MG: 1 INJECTION INTRAMUSCULAR; INTRAVENOUS at 11:04

## 2024-03-18 RX ADMIN — PROPOFOL 10 MG: 10 INJECTION, EMULSION INTRAVENOUS at 11:11

## 2024-03-18 SDOH — HEALTH STABILITY: MENTAL HEALTH: CURRENT SMOKER: 0

## 2024-03-18 ASSESSMENT — COGNITIVE AND FUNCTIONAL STATUS - GENERAL
DAILY ACTIVITIY SCORE: 24
MOBILITY SCORE: 24

## 2024-03-18 ASSESSMENT — PAIN DESCRIPTION - ORIENTATION: ORIENTATION: LOWER

## 2024-03-18 ASSESSMENT — PAIN SCALES - GENERAL
PAINLEVEL_OUTOF10: 0 - NO PAIN
PAINLEVEL_OUTOF10: 3
PAINLEVEL_OUTOF10: 0 - NO PAIN

## 2024-03-18 ASSESSMENT — PAIN DESCRIPTION - LOCATION: LOCATION: FOOT

## 2024-03-18 ASSESSMENT — PAIN - FUNCTIONAL ASSESSMENT
PAIN_FUNCTIONAL_ASSESSMENT: 0-10
PAIN_FUNCTIONAL_ASSESSMENT: CPOT (CRITICAL CARE PAIN OBSERVATION TOOL)
PAIN_FUNCTIONAL_ASSESSMENT: 0-10

## 2024-03-18 NOTE — PROGRESS NOTES
Regina Flaherty is a 63 y.o. female on day 4 of admission presenting with Cellulitis of right lower extremity.      Subjective   Patient examined sitting up in bed with no new complaints.  She denies chest pain, shortness of breath, or abdominal pain.  She further denies any headache, dizziness, nausea/vomiting.       Objective     Last Recorded Vitals  /77   Pulse 57   Temp 36 °C (96.8 °F) (Temporal)   Resp 16   Wt 77.1 kg (170 lb)   SpO2 97%   Intake/Output last 3 Shifts:    Intake/Output Summary (Last 24 hours) at 3/18/2024 1415  Last data filed at 3/18/2024 1142  Gross per 24 hour   Intake 500 ml   Output 5 ml   Net 495 ml       Admission Weight  Weight: 77.1 kg (170 lb) (03/14/24 1410)    Daily Weight  03/14/24 : 77.1 kg (170 lb)          Physical Exam                 Assessment/Plan                  Principal Problem:    Cellulitis of right lower extremity  Active Problems:    Cellulitis and abscess of foot    Osteomyelitis of ankle and foot (CMS/HCC)    Recent abscess of right foot/left foot ulcer  .  Status post: Postop day 0  #1 Right lower extremity Incision and drainage of abscess (15658)  #2 Right lower extremity bone biopsy (20245)   #3 Left lower extremity preparation of wound bed   #4 Left lower extremity application of allograft <10 square cm   .  PT/OT  .  Infectious diseases following continue linezolid and pyridoxine  .  Podiatry following  .  Wound care following    History of diabetes mellitus  .  Not on medication  .  Patient states she no longer has diabetes  .  Hemoglobin A1c is 5.8    DVT prophylaxis  .  Subcutaneous heparin    Disposition  .  Patient would like to go home when ready and is refusing home health care                BRYAN Goff-CNP

## 2024-03-18 NOTE — CARE PLAN
Problem: Pain  Goal: My pain/discomfort is manageable  Outcome: Progressing     Problem: Safety  Goal: Patient will be injury free during hospitalization  Outcome: Progressing  Goal: I will remain free of falls  Outcome: Progressing     Problem: Daily Care  Goal: Daily care needs are met  Outcome: Progressing     Problem: Psychosocial Needs  Goal: Demonstrates ability to cope with hospitalization/illness  Outcome: Progressing  Goal: Collaborate with me, my family, and caregiver to identify my specific goals  Outcome: Progressing     Problem: Discharge Barriers  Goal: My discharge needs are met  Outcome: Progressing     Problem: Skin  Goal: Decreased wound size/increased tissue granulation at next dressing change  Outcome: Progressing  Flowsheets (Taken 3/18/2024 0012)  Decreased wound size/increased tissue granulation at next dressing change:   Promote sleep for wound healing   Protective dressings over bony prominences   Utilize specialty bed per algorithm  Goal: Participates in plan/prevention/treatment measures  Outcome: Progressing  Flowsheets (Taken 3/18/2024 0012)  Participates in plan/prevention/treatment measures:   Discuss with provider PT/OT consult   Elevate heels   Increase activity/out of bed for meals  Goal: Prevent/manage excess moisture  Outcome: Progressing  Flowsheets (Taken 3/18/2024 0012)  Prevent/manage excess moisture:   Cleanse incontinence/protect with barrier cream   Follow provider orders for dressing changes   Moisturize dry skin   Monitor for/manage infection if present   Use wicking fabric (obtain order)  Goal: Prevent/minimize sheer/friction injuries  Outcome: Progressing  Flowsheets (Taken 3/18/2024 0012)  Prevent/minimize sheer/friction injuries:   Complete micro-shifts as needed if patient unable. Adjust patient position to relieve pressure points, not a full turn   Increase activity/out of bed for meals   HOB 30 degrees or less   Turn/reposition every 2 hours/use  positioning/transfer devices   Use pull sheet   Utilize specialty bed per algorithm  Goal: Promote/optimize nutrition  Outcome: Progressing  Flowsheets (Taken 3/18/2024 0012)  Promote/optimize nutrition:   Assist with feeding   Consume > 50% meals/supplements   Discuss with provider if NPO > 2 days   Monitor/record intake including meals   Offer water/supplements/favorite foods   Reassess MST if dietician not consulted  Goal: Promote skin healing  Outcome: Progressing  Flowsheets (Taken 3/18/2024 0012)  Promote skin healing:   Ensure correct size (line/device) and apply per  instructions   Assess skin/pad under line(s)/device(s)   Protective dressings over bony prominences   Rotate device position/do not position patient on device   Turn/reposition every 2 hours/use positioning/transfer devices     Problem: Fall/Injury  Goal: Not fall by end of shift  Outcome: Progressing  Goal: Be free from injury by end of the shift  Outcome: Progressing  Goal: Verbalize understanding of personal risk factors for fall in the hospital  Outcome: Progressing  Goal: Verbalize understanding of risk factor reduction measures to prevent injury from fall in the home  Outcome: Progressing  Goal: Use assistive devices by end of the shift  Outcome: Progressing  Goal: Pace activities to prevent fatigue by end of the shift  Outcome: Progressing   The patient's goals for the shift include      The clinical goals for the shift include maintain safety, manage pain    Over the shift, the patient did not make progress toward the following goals. Barriers to progression include . Recommendations to address these barriers include .

## 2024-03-18 NOTE — PROGRESS NOTES
"INFECTIOUS DISEASES PROGRESS NOTE    Consulted / following patient for:  Right foot abscess and wound infection    Subjective   Interval History:   Awaiting surgery today  Tolerating linezolid  Describes \"\"red man\" syndrome\" from IV vancomycin previously under the care of Dr. Mathis, and has had significant erythroderma with doxycycline, cephalexin, and Zosyn.  Tolerates Bactrim       Objective   PHYSICAL EXAMINATION  Vital signs:  Visit Vitals  /79 (BP Location: Right arm, Patient Position: Lying)   Pulse 55   Temp 36.6 °C (97.9 °F) (Oral)   Resp 17      General: No acute distress  Extremities: Right dressings in place, not removed by me today.  No proximal erythema    Relevant Results  WBC: 4500     Results from last 72 hours   Lab Units 03/18/24  0502   CREATININE mg/dL 0.50   ANION GAP mmol/L 10   EGFR mL/min/1.73m*2 >90     Microbiology:  Blood (3/14): Negative X2  Wound 3/5): MSSA  Wound (3/14): MSSA    Imaging:  Right foot (3/14): No subcutaneous gas or bony erosion      ASSESSMENT:  Right foot abscess and wound infection  Extensive antibiotic allergies  Chronic left foot ulcer - not infected     SUGGESTIONS:  Continue linezolid   Add pyridoxine to help attenuate hematologic adverse effects of linezolid therapy  Await today's findings in OR with further recommendations to follow    Keith Ramos MD  [facility]  Office:  [phone]  "

## 2024-03-18 NOTE — CONSULTS
Wound Care Consult     Visit Date: 3/18/2024      Patient Name: Regina Flaherty         MRN: [id number]           YOB: 1960     Reason for Consult:  Bilateral foot wounds       Wound History: Present on admission. Patient with chronic ulcer to Left Plantar and wound to Right foot from dog jumping and scratching her foot which had developed cellulitis. [facility] and referred to ER 3/14 due to infection.     A 63 y.o. year old female admitted for Principal Problem:    Cellulitis of right lower extremity  Active Problems:    Cellulitis and abscess of foot    Diabetes mellitus, type 2 (CMS/HCC)    Osteomyelitis of ankle and foot (CMS/HCC)      Past Medical History:   Diagnosis Date    Arthritis     Diabetes mellitus (CMS/HCC)       Past Surgical History:   Procedure Laterality Date    OTHER SURGICAL HISTORY  2019     section    OTHER SURGICAL HISTORY  2019    Hysterectomy    OTHER SURGICAL HISTORY  2019    Cystocele repair    OTHER SURGICAL HISTORY  2019    Mid-urethral sling insertion    OTHER SURGICAL HISTORY  2019    Abdominal panniculectomy    OTHER SURGICAL HISTORY  2019    Foot surgery    OTHER SURGICAL HISTORY  11/15/2019    Sacrocolpopexy    OTHER SURGICAL HISTORY  11/15/2019    Rectocele repair       Scheduled medications  aspirin, 81 mg, oral, Daily  heparin, 5,000 Units, subcutaneous, q12h  linezolid, 600 mg, oral, q12h GIGI  pantoprazole, 40 mg, oral, Daily  pyridoxine, 50 mg, oral, Daily      Continuous medications     PRN medications  PRN medications: acetaminophen, diphenhydrAMINE, LORazepam, ondansetron, oxyCODONE-acetaminophen, polyethylene glycol    Allergies   Allergen Reactions    Vancomycin Other     Minor Syndrome    Zosyn [Piperacillin-Tazobactam] Anaphylaxis    Doxycycline Rash     Sore throat    Keflex [Cephalexin] Rash        Pertinent Labs:   [place], Urine   Date Value Ref Range Status   2018  348 (H) 0 - 23 MG/L Final     Albumin   Date Value Ref Range Status   03/14/2024 4.0 3.5 - 5.0 g/dL Final       Wound Assessment:  Wound 03/15/24 Other (comment) Heel Left;Plantar (Active)   Wound Image   03/15/24 1430   Site Assessment Dry;Red 03/15/24 1430   [place]-Wound Assessment Dry 03/15/24 1430   Non-staged Wound Description Full thickness 03/15/24 1430   Wound Length (cm) 0.8 cm 03/15/24 1430   Wound Width (cm) 1.5 cm 03/15/24 1430   Wound Surface Area (cm^2) 1.2 cm^2 03/15/24 1430   Wound Depth (cm) 0.2 cm 03/15/24 1430   Wound Volume (cm^3) 0.24 cm^3 03/15/24 1430   Margins Well-defined edges 03/15/24 1430   Drainage Description None 03/15/24 1430   Drainage Amount None 03/15/24 1430   Dressing Gauze 03/15/24 1430   Dressing Changed New 03/15/24 1430   Dressing Status Clean;Dry 03/15/24 1430       Wound 03/15/24 Other (comment) Foot Dorsal foot;Right (Active)   Wound Image   03/15/24 1430   Site Assessment Pink;Red 03/15/24 1430   [place]-Wound Assessment Dry 03/15/24 1430   Non-staged Wound Description Full thickness 03/15/24 1430   Wound Length (cm) 1.5 cm 03/15/24 1430   Wound Width (cm) 1.8 cm 03/15/24 1430   Wound Surface Area (cm^2) 2.7 cm^2 03/15/24 1430   Wound Depth (cm) 0.1 cm 03/15/24 1430   Wound Volume (cm^3) 0.27 cm^3 03/15/24 1430   Margins Well-defined edges 03/15/24 1430   Drainage Description Serous 03/15/24 1430   Dressing Gauze 03/15/24 1430   Dressing Changed New 03/15/24 1430   Dressing Status Clean;Dry 03/15/24 1430       Wound Team Summary Assessment:     Exam conducted on day 1 of stay with knowledge of Floor Nurse. Introductions made to patient alert and oriented. On exam Dr. Doran in with patient covering wounds with gauze. Dressings peeled back. Patient with dry ulcer to Left plantar and open wound, scabbing, and some dry maceration to dorsal foot, Recommend Betadine and DCD daily. Patient states that she has been doing her own woundcare at home and prefers to do it herself. Patient  states she is to have surgery to clean wounds on Monday 3/18. No other skin issues or concerns. Patient is on an [employer] 2 bedframe with Accumax mattress. Nora Wells LPN updated, to continue pressure injury prevention interventions, Woundcare, and nursing to continue to follow providers orders. Reconsult Wound RN PRN. Arleen BURKS RN        Wound Team Plan: Continue to follow Provider orders. Jeanmarie Feet- apply betadine and DCD daily and prn.      Arleen Gabriel RN  3/18/2024  9:05 AM

## 2024-03-18 NOTE — NURSING NOTE
Pt back in her room from surgery, alert and orientedx3, call light and personal belongings within reach.

## 2024-03-18 NOTE — SIGNIFICANT EVENT
Assessment:  - S/P RLE incision and drainage with bone biopsy; LLE wound debridement w application of wound allograft (DOS: 3/18/24)     Plan:  - S/P RLE incision and drainage with bone biopsy; LLE wound debridement w application of wound allograft (DOS: 3/18/24)  - Restart medications, anticoagulants, diet per medicine  - WBAT to bilateral foot concentrated to heel in post op shoe.   - PT OT eval  - Keep dressing dry and intact to bilateral foot. Please reinforce with DSD if prominent strikethrough  - Elevate bilateral lower extremity on pillows and blankets   - Dispense post-op shoes   If any podiatry related concerns or questions please reach out via Haiku        Plan per and discussed with attending Dr. Ranulfo Cardenas

## 2024-03-18 NOTE — INTERVAL H&P NOTE
H&P reviewed. The patient was examined and there are no changes to the H&P.  
H&P reviewed. The patient was examined and there are no changes to the H&P.    Patient to have right foot debridement/I and D with bone biopsy and left foot debridement with application of graft.     Ranulfo Cardenas DPM    
No

## 2024-03-18 NOTE — ANESTHESIA PREPROCEDURE EVALUATION
Patient: Regina Flaherty    Procedure Information       Date/Time: 24 1000    Procedures:       Biopsy Bone Lower Extremity (Right)      Lower Extremity I  and  D (Right)      Debridement Foot (Bilateral)    Location: [facility] / [facility]    Surgeons: Ranulfo Cardenas DPM          Past Medical History:   Diagnosis Date    Arthritis     Diabetes mellitus (CMS/HCC)      Past Surgical History:   Procedure Laterality Date    OTHER SURGICAL HISTORY  2019     section    OTHER SURGICAL HISTORY  2019    Hysterectomy    OTHER SURGICAL HISTORY  2019    Cystocele repair    OTHER SURGICAL HISTORY  2019    Mid-urethral sling insertion    OTHER SURGICAL HISTORY  2019    Abdominal panniculectomy    OTHER SURGICAL HISTORY  2019    Foot surgery    OTHER SURGICAL HISTORY  11/15/2019    Sacrocolpopexy    OTHER SURGICAL HISTORY  11/15/2019    Rectocele repair         Relevant Problems   Anesthesia (within normal limits)      Endocrine  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, and metatarsal amputations   (+) Diabetes mellitus, type 2 (CMS/HCC)      Infectious Disease   (+) Osteomyelitis of ankle and foot (CMS/HCC)      Other   (+) Osteomyelitis of ankle and foot (CMS/HCC)       Clinical information reviewed:   Tobacco  Allergies  Meds  Problems  Med Hx  Surg Hx   Fam Hx  Soc   Hx        NPO Detail:  No data recorded     Physical Exam    Airway  Mallampati: II  TM distance: >3 FB  Neck ROM: full     Cardiovascular    Dental    Pulmonary    Abdominal            Anesthesia Plan    History of general anesthesia?: yes  History of complications of general anesthesia?: no    ASA 2     MAC     The patient is not a current smoker.  Patient was not previously instructed to abstain from smoking on day of procedure.  Patient did not smoke on day of procedure.  Education provided regarding risk of obstructive sleep apnea.  intravenous induction   Postoperative administration of opioids is  intended.  Anesthetic plan and risks discussed with patient.  Use of blood products discussed with patient who consented to blood products.    Plan discussed with CRNA and CAA.

## 2024-03-18 NOTE — ANESTHESIA POSTPROCEDURE EVALUATION
Patient: Regina Flaherty    Procedure Summary       Date: 03/18/24 Room / Location: [facility] OR 10 / [facility]    Anesthesia Start: 1102 Anesthesia Stop: 1155    Procedures:       Biopsy Bone Lower Extremity Right foot, Right foot Incision and Drainage with debridement of the subcutaenous tissue layer (Right: Foot)      Lower Extremity I  and  D (Right: Foot)      Debridement Foot (Right) Diagnosis:       Cellulitis of right lower extremity      Cellulitis and abscess of foot      Osteomyelitis of ankle and foot (CMS/HCC)      (Cellulitis of right lower extremity [L03.115])      (Cellulitis and abscess of foot [L03.119, L02.619])      (Osteomyelitis of ankle and foot (CMS/HCC) [M86.9])    Surgeons: Ranulfo Cardenas DPM Responsible Provider: Marlon Lovell DO    Anesthesia Type: general ASA Status: 2            Anesthesia Type: general    Vitals Value Taken Time   /74 03/18/24 1200   Temp 36 °C (96.8 °F) 03/18/24 1153   Pulse 52 03/18/24 1200   Resp 17 03/18/24 1200   SpO2 100 % 03/18/24 1200       Anesthesia Post Evaluation    Patient location during evaluation: bedside  Patient participation: complete - patient participated  Level of consciousness: awake and alert  Pain management: adequate  Multimodal analgesia pain management approach  Airway patency: patent  Two or more strategies used to mitigate risk of obstructive sleep apnea  Cardiovascular status: acceptable  Respiratory status: acceptable  Hydration status: acceptable  Postoperative Nausea and Vomiting: none        There were no known notable events for this encounter.

## 2024-03-18 NOTE — PROGRESS NOTES
Regina Flaherty is a 63 y.o. female on day 4 of admission presenting with Cellulitis of right lower extremity.      To OR today for I&D and bone biopsy of LLE.  ID following.  Needs PT/OT when able. Will follow for home going needs.     DC PLAN TBD             Dipti Swenson RN

## 2024-03-18 NOTE — PERIOPERATIVE NURSING NOTE
"Pt arrived to PACU preoperatively via bed in no apparent distress, denies pain, c/o some anxiety. Pt denies any jewelry, dentures, glasses etc and stated she took \"everything off before I came down\". No personal belongings with pt.   "

## 2024-03-18 NOTE — ANESTHESIA PROCEDURE NOTES
Peripheral IV  Date/Time: 3/18/2024 10:50 AM  Inserted by: Keith Petit APRN-CRNA    Placement  Needle size: 22 G  Laterality: right  Location: wrist  Local anesthetic: none  Site prep: alcohol  Technique: anatomical landmarks  Attempts: 1

## 2024-03-18 NOTE — OP NOTE
Biopsy Bone Lower Extremity Right foot, Right foot Incision and Drainage with debridement of the subcutaenous tissue layer (R), Lower Extremity I  and  D (R), Debridement Foot (B) Operative Note     Date: 3/14/2024 - 3/18/2024  OR Location: [facility]    Name: Regina Flaherty, : 1960, Age: 63 y.o., MRN: [id number], Sex: female    Diagnosis  Pre-op Diagnosis     * Cellulitis of right lower extremity [L03.115]     * Cellulitis and abscess of foot [L03.119, L02.619]     * Osteomyelitis of ankle and foot (CMS/HCC) [M86.9] Post-op Diagnosis     * Cellulitis of right lower extremity [L03.115]     * Cellulitis and abscess of foot [L03.119, L02.619]     * Osteomyelitis of ankle and foot (CMS/HCC) [M86.9]     Procedures    #1 Right lower extremity Incision and drainage of abscess (87161)  #2 Right lower extremity bone biopsy ()    #3 Left lower extremity preparation of wound bed   #4 Left lower extremity application of allograft <10 square cm    Surgeons      * Ranulfo Cardenas - Primary    Resident/Fellow/Other Assistant:  Surgeon(s) and Role:     * Earle Bran DPM - Assisting     * Santos Gan DPM - Assisting    Procedure Summary  Anesthesia: Monitor Anesthesia Care  ASA: II  Anesthesia Staff: Anesthesiologist: Marlon Lovell DO  CRNA: BRYAN Guerrero-CRNA  SRNA: Tiffany Garcia  Estimated Blood Loss: 5mL  Intra-op Medications: Administrations occurring from 1000 to 1215 on 24:  * No intraprocedure medications in log *           Anesthesia Record               Intraprocedure I/O Totals          Intake    Propofol Drip 0.00 mL    The total shown is the total volume documented since Anesthesia Start was filed.    LR infusion 500.00 mL    Total Intake 500 mL       Output    Est. Blood Loss 5 mL    Total Output 5 mL       Net    Net Volume 495 mL          Specimen:   ID Type Source Tests Collected by Time   1 : RIGHT FOOT BONE BIOPSY Tissue BONE BIOPSY (NO DECAL) SURGICAL PATHOLOGY EXAM  Ranulfo Cardenas DPM 3/18/2024 1140   A : RIGHT FOOT ABSCESS CULTURE SWAB Swab FOOT AMPUTATION RIGHT TISSUE/WOUND CULTURE/SMEAR Ranulfo Cardenas DPM 3/18/2024 1126   B : RIGHT FOOT BONE BIOPSY CULTURE Tissue BONE BIOPSY (NO DECAL) TISSUE/WOUND CULTURE/SMEAR Ranulfo Cardenas DPM 3/18/2024 1142        Staff:   Circulator: Aristides Herndon RN  Scrub Person: Tamy Theodore; Mckinley Shah         Drains and/or Catheters: * None in log *    Tourniquet Times:   * Missing tourniquet times found for documented tourniquets in lo *     Implants:     Findings: <5 ml's of purulent drainage of right foot.    Indications: Regina Flaherty is an 63 y.o. female who is having surgery for Cellulitis of right lower extremity [L03.115]  Cellulitis and abscess of foot [L03.119, L02.619]  Osteomyelitis of ankle and foot (CMS/[facility]) [M86.9]. presents to [facility], [place], operated by [facility] for right LE cellulitis and foot wounds. 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 Patient states that her dog jumped on the right foot, and she developed redness and swelling over the lateral aspect of the dorsum of the right foot. The patient states that she had 1 area that had been opened up and was packed yesterday in the wound center, but the doctor was concerned about the other, and felt there may be a developing abscess. They insisted the patient come to the hospital for further evaluation. Patient herself states there is no nausea or vomiting. Patient states there is no fevers or chills. The patient states that she has not had any other associated symptoms. The patient basically was told to come directly to the hospital for admission. Pt is indicated for above stated procedures    The patient was seen in the preoperative area. The risks, benefits, complications, treatment options, non-operative alternatives, expected recovery and outcomes were discussed with the patient. The possibilities of reaction to medication,  pulmonary aspiration, injury to surrounding structures, bleeding, recurrent infection, the need for additional procedures, failure to diagnose a condition, and creating a complication requiring transfusion or operation were discussed with the patient. The patient concurred with the proposed plan, giving informed consent.  The site of surgery was properly noted/marked if necessary per policy. The patient has been actively warmed in preoperative area. Preoperative antibiotics have been ordered and given within 1 hours of incision. Venous thrombosis prophylaxis have been ordered including unilateral sequential compression device    Procedure Details:     PROCEDURAL NOTE  The patient was brought to the operating room and positioned supine on the operating room table.  A time-out was performed, identifying the patient by name, medical record number, date of birth, site of  procedure, and procedure to be performed.  The pt received their scheduled antibiotics and SCDs were placed for DVT prophylaxis. MAC was performed by the Anesthesiology team and local anesthesia was administered by the podiatry team to the appropriate extremities using 20 ml's 1:1 mixture 0.5% marcaine and 1% lidocaine. All bony prominences were well padded. Patient was then secured with safety straps.  Attention was drawn to the bilateral extremity. No tourniquet was utilized for the above stated procedures. The area was then prepped and draped in the usual sterile fashion.     #1 Right lower extremity Incision and drainage of abscess (07374)  Attention was directed to the distal aspect of the right TMA site where area of fluctuance and associated erythema and cellulitis was noted. A 15 blade was utilized to lyse the fluctuance and approximately 5 ml's purulent drainage output was noted. A wound swab culture of the output was collected and sent to microbiology. With the fluctuance pocket now opened, the site was copiously exsanguinated and irrigated  with sterile saline. The site was further sharply debrided utilizing the versajet tool.       #2 Right lower extremity bone biopsy (20245)  Once the procedural site was copiously exsanguinated,  irrigated, and debrided a bone biopsy of the fifth metatarsal stump was collected utilizing a Jamshidi needle. This bone was sent over to microbiology. The site was dressed with iodoform packing, DSD,  light ACE wrap.     #3 Preparation of wound bed for allograft application left foot(15003)  Attention was directed to  left foot sub fourth metatarsal, the location of the wound. The wound measured 1.1x1.6x0.6 cm to the level of subcutaneous tissue. Total surface area: 1.76 cm^2. Fibrotic tissue was noted overlying the wound bed with surrounding hyperkeratotic tissue. No active drainage or bleeding noted. Mild periwound erythema was noted. No palpable fluctuance was noted. Using a curette and versajet, sharp excisional debridement was performed to the level of and including subcutaneous tissue in order to prep the wound base for allograft application. After sufficient and appropriate wound debridement post debridement wound measurements were 1.3x1.7x0.6 cm with overlying healthy bleeding tissue. Hemostasis was achieved with pressure.    #4 Application of allograft left foot (04847 [<100cm])  The wound was covered with integra bilayer allograft application. Total surface area: 2.21 cm^2. The allograft was properly secured with  3-0 monocryl suture. The graft was then covered with adaptic and gauze fluffs, kerlix, and light ACE.    Perfusion was noted to bilateral lower extremities. The patient tolerated the procedure well, awakened from anesthesia without any difficulties, and was transferred to the patient in stable condition.    POST OP PLAN:  Patient will be sent to the floors and managed by primary team. All medications may be restarted per primary team and pt may resume normal diet. If prominent strikethrough from  dressing, please reinforce dressing with DSD. Podiatry will continue to follow pt and assess pt's status and surgical site tomorrow.          Complications:  None; patient tolerated the procedure well.    Disposition: PACU - hemodynamically stable.  Condition: stable         Additional Details: none    Attending Attestation:     Ranulfo Cardenas  Phone Number: [phone]

## 2024-03-19 LAB
ANION GAP SERPL CALC-SCNC: 13 MMOL/L
BASOPHILS # BLD AUTO: 0.05 X10*3/UL (ref 0–0.1)
BASOPHILS NFR BLD AUTO: 1 %
BUN SERPL-MCNC: 32 MG/DL (ref 8–25)
CALCIUM SERPL-MCNC: 8.9 MG/DL (ref 8.5–10.4)
CHLORIDE SERPL-SCNC: 102 MMOL/L (ref 97–107)
CO2 SERPL-SCNC: 25 MMOL/L (ref 24–31)
CREAT SERPL-MCNC: 0.6 MG/DL (ref 0.4–1.6)
EGFRCR SERPLBLD CKD-EPI 2021: >90 ML/MIN/1.73M*2
EOSINOPHIL # BLD AUTO: 0.47 X10*3/UL (ref 0–0.7)
EOSINOPHIL NFR BLD AUTO: 9.2 %
ERYTHROCYTE [DISTWIDTH] IN BLOOD BY AUTOMATED COUNT: 14.3 % (ref 11.5–14.5)
GLUCOSE SERPL-MCNC: 124 MG/DL (ref 65–99)
HCT VFR BLD AUTO: 36.4 % (ref 36–46)
HGB BLD-MCNC: 11.5 G/DL (ref 12–16)
IMM GRANULOCYTES # BLD AUTO: 0.03 X10*3/UL (ref 0–0.7)
IMM GRANULOCYTES NFR BLD AUTO: 0.6 % (ref 0–0.9)
LYMPHOCYTES # BLD AUTO: 1.57 X10*3/UL (ref 1.2–4.8)
LYMPHOCYTES NFR BLD AUTO: 30.7 %
MCH RBC QN AUTO: 29.8 PG (ref 26–34)
MCHC RBC AUTO-ENTMCNC: 31.6 G/DL (ref 32–36)
MCV RBC AUTO: 94 FL (ref 80–100)
MONOCYTES # BLD AUTO: 0.49 X10*3/UL (ref 0.1–1)
MONOCYTES NFR BLD AUTO: 9.6 %
NEUTROPHILS # BLD AUTO: 2.51 X10*3/UL (ref 1.2–7.7)
NEUTROPHILS NFR BLD AUTO: 48.9 %
NRBC BLD-RTO: 0 /100 WBCS (ref 0–0)
PLATELET # BLD AUTO: 283 X10*3/UL (ref 150–450)
POTASSIUM SERPL-SCNC: 4.6 MMOL/L (ref 3.4–5.1)
RBC # BLD AUTO: 3.86 X10*6/UL (ref 4–5.2)
SODIUM SERPL-SCNC: 140 MMOL/L (ref 133–145)
WBC # BLD AUTO: 5.1 X10*3/UL (ref 4.4–11.3)

## 2024-03-19 PROCEDURE — 2500000004 HC RX 250 GENERAL PHARMACY W/ HCPCS (ALT 636 FOR OP/ED)

## 2024-03-19 PROCEDURE — 2500000001 HC RX 250 WO HCPCS SELF ADMINISTERED DRUGS (ALT 637 FOR MEDICARE OP)

## 2024-03-19 PROCEDURE — 2500000001 HC RX 250 WO HCPCS SELF ADMINISTERED DRUGS (ALT 637 FOR MEDICARE OP): Performed by: NURSE PRACTITIONER

## 2024-03-19 PROCEDURE — 1210000001 HC SEMI-PRIVATE ROOM DAILY

## 2024-03-19 PROCEDURE — 85025 COMPLETE CBC W/AUTO DIFF WBC: CPT

## 2024-03-19 PROCEDURE — 36415 COLL VENOUS BLD VENIPUNCTURE: CPT

## 2024-03-19 PROCEDURE — 80048 BASIC METABOLIC PNL TOTAL CA: CPT

## 2024-03-19 RX ORDER — BISACODYL 5 MG
5 TABLET, DELAYED RELEASE (ENTERIC COATED) ORAL DAILY PRN
Status: DISCONTINUED | OUTPATIENT
Start: 2024-03-19 | End: 2024-03-20 | Stop reason: HOSPADM

## 2024-03-19 RX ADMIN — LINEZOLID 600 MG: 600 TABLET, FILM COATED ORAL at 08:43

## 2024-03-19 RX ADMIN — LINEZOLID 600 MG: 600 TABLET, FILM COATED ORAL at 20:13

## 2024-03-19 RX ADMIN — OXYCODONE HYDROCHLORIDE AND ACETAMINOPHEN 1 TABLET: 5; 325 TABLET ORAL at 12:45

## 2024-03-19 RX ADMIN — ASPIRIN 81 MG: 81 TABLET, COATED ORAL at 08:42

## 2024-03-19 RX ADMIN — BISACODYL 5 MG: 5 TABLET, COATED ORAL at 09:53

## 2024-03-19 RX ADMIN — OXYCODONE HYDROCHLORIDE AND ACETAMINOPHEN 1 TABLET: 5; 325 TABLET ORAL at 08:42

## 2024-03-19 ASSESSMENT — COGNITIVE AND FUNCTIONAL STATUS - GENERAL
DAILY ACTIVITIY SCORE: 24
DAILY ACTIVITIY SCORE: 24
MOBILITY SCORE: 24
MOBILITY SCORE: 24

## 2024-03-19 ASSESSMENT — PAIN - FUNCTIONAL ASSESSMENT
PAIN_FUNCTIONAL_ASSESSMENT: 0-10

## 2024-03-19 ASSESSMENT — PAIN DESCRIPTION - ORIENTATION
ORIENTATION: RIGHT
ORIENTATION: RIGHT

## 2024-03-19 ASSESSMENT — PAIN SCALES - GENERAL
PAINLEVEL_OUTOF10: 7
PAINLEVEL_OUTOF10: 0 - NO PAIN
PAINLEVEL_OUTOF10: 1
PAINLEVEL_OUTOF10: 6
PAINLEVEL_OUTOF10: 2

## 2024-03-19 ASSESSMENT — PAIN DESCRIPTION - LOCATION
LOCATION: FOOT
LOCATION: FOOT

## 2024-03-19 NOTE — PROGRESS NOTES
Occupational Therapy                 Therapy Communication Note    Patient Name: Regina Flaherty  MRN: [id number]  Today's Date: 3/19/2024     Discipline: Occupational Therapy    Missed Visit Reason: Missed Visit Reason: Parent refused (OT referral received, patient reports she has been dealing with these issues for 5 years, has no OT needs, wants to be discharged from OT. Will discontinue OT per patients request.)    Missed Time: Cancel    Comment: Discontinue OT per patients request.

## 2024-03-19 NOTE — PROGRESS NOTES
Regina Flaherty is a 63 y.o. female on day 5 of admission presenting with Cellulitis of right lower extremity.      Subjective   Patient examined sitting up in bed no complaints of pain.  She denies chest pain, shortness of breath, or abdominal pain.  She denies headache, dizziness, nausea/vomiting.       Objective     Last Recorded Vitals  /68 (BP Location: Right arm, Patient Position: Lying)   Pulse 58   Temp 36.6 °C (97.9 °F) (Oral)   Resp 16   Wt 77.1 kg (170 lb)   SpO2 97%   Intake/Output last 3 Shifts:    Intake/Output Summary (Last 24 hours) at 3/19/2024 1148  Last data filed at 3/19/2024 0748  Gross per 24 hour   Intake 0 ml   Output --   Net 0 ml       Admission Weight  Weight: 77.1 kg (170 lb) (03/14/24 1410)    Daily Weight  03/14/24 : 77.1 kg (170 lb)          Physical Exam  Constitutional: No acute distress, calm, cooperative  HEENT: PERRL, normocephalic, atraumatic, mucous membranes moist  Cardiovascular: Regular rhythm and rate, no MGR  Respiratory: Lungs clear to auscultation,   Gastrointestinal: Bowel sounds positive x 4, soft, nontender  Neurologic: Alert and oriented x 3 equal strength bilaterally  Musculoskeletal: Able to move all extremities, no edema  Skin: Bilateral foot dressings dry and intact               Assessment/Plan                  Principal Problem:    Cellulitis of right lower extremity  Active Problems:    Cellulitis and abscess of foot    Osteomyelitis of ankle and foot (CMS/HCC)  Cellulitis and abscess of right foot/left foot ulcer  .  Status post: Postop day 1  #1 Right lower extremity Incision and drainage of abscess (10060)  #2 Right lower extremity bone biopsy (20245)   #3 Left lower extremity preparation of wound bed   #4 Left lower extremity application of allograft <10 square cm   .  PT/OT  .  Infectious diseases following continue linezolid and pyridoxine  .  Awaiting final culture results most likely go home on Bactrim  .  Podiatry following  .  Wound care  following     History of diabetes mellitus  .  Not on medication  .  Patient states she no longer has diabetes  .  Hemoglobin A1c is 5.8     DVT prophylaxis  .  Subcutaneous heparin     Disposition  .  Most likely discharge home tomorrow or Thursday depending clearance from ID and podiatry                BRYAN Goff-CNP

## 2024-03-19 NOTE — CARE PLAN
Problem: Pain  Goal: My pain/discomfort is manageable  Outcome: Progressing     Problem: Safety  Goal: Patient will be injury free during hospitalization  Outcome: Progressing  Goal: I will remain free of falls  Outcome: Progressing     Problem: Daily Care  Goal: Daily care needs are met  Outcome: Progressing     Problem: Psychosocial Needs  Goal: Demonstrates ability to cope with hospitalization/illness  Outcome: Progressing  Goal: Collaborate with me, my family, and caregiver to identify my specific goals  Outcome: Progressing     Problem: Discharge Barriers  Goal: My discharge needs are met  Outcome: Progressing     Problem: Skin  Goal: Decreased wound size/increased tissue granulation at next dressing change  Outcome: Progressing  Goal: Participates in plan/prevention/treatment measures  Outcome: Progressing  Goal: Prevent/manage excess moisture  Outcome: Progressing  Goal: Prevent/minimize sheer/friction injuries  Outcome: Progressing  Goal: Promote/optimize nutrition  Outcome: Progressing  Goal: Promote skin healing  Outcome: Progressing     Problem: Fall/Injury  Goal: Not fall by end of shift  Outcome: Progressing  Goal: Be free from injury by end of the shift  Outcome: Progressing  Goal: Verbalize understanding of personal risk factors for fall in the hospital  Outcome: Progressing  Goal: Verbalize understanding of risk factor reduction measures to prevent injury from fall in the home  Outcome: Progressing  Goal: Use assistive devices by end of the shift  Outcome: Progressing  Goal: Pace activities to prevent fatigue by end of the shift  Outcome: Progressing

## 2024-03-19 NOTE — NURSING NOTE
Assumed care. Patient is laying in bed awake. Report given at bedside. Bed alarm on, call light within reach.

## 2024-03-19 NOTE — PROGRESS NOTES
Physical Therapy                 Therapy Communication Note    Patient Name: Regina Flaherty  MRN: [id number]  Today's Date: 3/19/2024     Discipline: Physical Therapy    Missed Visit Reason: Missed Visit Reason: Patient refused (Attempted to see pt for PT evaluation and dispense B post-op heel weight-bearing shoes per podiatry; pt adamantly refused to participate or have shoes applied despite max encouragement/education on benefits of participation.)    Missed Time: Attempt    Comment:

## 2024-03-19 NOTE — PROGRESS NOTES
INFECTIOUS DISEASES PROGRESS NOTE    Consulted / following patient for:  Right foot abscess and wound infection    Subjective   Interval History:   No postoperative complaints     Objective   PHYSICAL EXAMINATION  Vital signs:  Visit Vitals  /68 (BP Location: Right arm, Patient Position: Lying)   Pulse 58   Temp 36.6 °C (97.9 °F) (Oral)   Resp 16      General: No acute distress  Extremities: Postoperative dressings in place, not removed by me today.  No proximal erythema    Relevant Results  WBC: 4500     Results from last 72 hours   Lab Units 03/19/24  0528   CREATININE mg/dL 0.60   ANION GAP mmol/L 13   EGFR mL/min/1.73m*2 >90     Microbiology:  Blood (3/14): Negative X2  Wound (3/5): MSSA  Wound (3/14): MSSA  Wound, OR (3/18): Pending X 2      Imaging:  Right foot (3/14): No subcutaneous gas or bony erosion      ASSESSMENT:  Right foot abscess and wound infection.  Now post-operative day 1 after incision and drainage, biopsy, allograft  Extensive antibiotic allergies  Chronic left foot ulcer - not infected     SUGGESTIONS:  Continue linezolid   Continue pyridoxine (to help attenuate hematologic adverse effects of linezolid therapy)  Await OR cultures, with further recommendations to follow.  Anticipate prolonged oral therapy, likely Bactrim    Keith Ramos MD  [facility]  Office:  [phone]

## 2024-03-19 NOTE — PROGRESS NOTES
PODIATRY PROGRESS NOTE    SERVICE DATE: 3/19/2024   SERVICE TIME:  12:48 PM    HPI:  Patient sitting comfortably at bedside this morning. The patient relates no pain at this time. Post-operative dressings are intact. She denies all constitutional symptoms. No other complaints. S/P Day 1 Right Foot Bone Biopsy, I&D and Left Foot Wound Debridement and Integra Bi-Layer Graft Application.    Past Medical History:   Diagnosis Date    Arthritis     Diabetes mellitus (CMS/HCC)      Past Surgical History:   Procedure Laterality Date    OTHER SURGICAL HISTORY  2019     section    OTHER SURGICAL HISTORY  2019    Hysterectomy    OTHER SURGICAL HISTORY  2019    Cystocele repair    OTHER SURGICAL HISTORY  2019    Mid-urethral sling insertion    OTHER SURGICAL HISTORY  2019    Abdominal panniculectomy    OTHER SURGICAL HISTORY  2019    Foot surgery    OTHER SURGICAL HISTORY  11/15/2019    Sacrocolpopexy    OTHER SURGICAL HISTORY  11/15/2019    Rectocele repair     No family history on file.  Social History     Tobacco Use    Smoking status: Never    Smokeless tobacco: Never   Substance Use Topics    Alcohol use: Never    Drug use: Never      Medications Prior to Admission   Medication Sig Dispense Refill Last Dose    acetaminophen (Tylenol 8 HOUR) 650 mg ER tablet Take 1 tablet (650 mg) by mouth every 8 hours if needed for mild pain (1 - 3). Do not crush, chew, or split.   3/14/2024    ascorbic acid (Vitamin C) 1,000 mg tablet Take 2 tablets (2,000 mg) by mouth once daily.   3/14/2024    ascorbic acid/collagen hydr (COLLAGEN SKIN RENEWAL ORAL) Take 2,500 mg by mouth once daily.   3/14/2024    aspirin 81 mg EC tablet Take 1 tablet (81 mg) by mouth once daily.   3/14/2024    biotin 5 mg capsule Take 2 capsules (10 mg) by mouth once daily.   3/14/2024    co-enzyme Q-10 50 mg capsule Take 2 capsules by mouth once daily.   3/14/2024    cyanocobalamin (Vitamin B-12) 500 mcg tablet Take 1  tablet (500 mcg) by mouth once daily.   3/14/2024    glucosamine HCl/chondroitin snell (GLUCOSAMINE-CHONDROITIN ORAL) Take 2 tablets by mouth once daily. 1500/1200   3/14/2024    multivitamin with minerals iron-free (Centrum Silver) Take 1 tablet by mouth once daily.   3/14/2024        Medications:  Scheduled Meds: aspirin, 81 mg, oral, Daily  heparin, 5,000 Units, subcutaneous, q12h  linezolid, 600 mg, oral, q12h GIGI  pantoprazole, 40 mg, oral, Daily  pyridoxine, 50 mg, oral, Daily      Continuous Infusions:    PRN Meds: PRN medications: acetaminophen, bisacodyl, diphenhydrAMINE, LORazepam, ondansetron, oxyCODONE-acetaminophen, polyethylene glycol    Allergies as of 03/14/2024 - Reviewed 03/14/2024   Allergen Reaction Noted    Vancomycin Anaphylaxis 03/14/2024    Zosyn [piperacillin-tazobactam] Anaphylaxis 03/14/2024    Doxycycline Rash 03/14/2024    Keflex [cephalexin] Rash 03/14/2024            Objective   PHYSICAL EXAM:  Physical Exam Performed:  Vitals:    03/19/24 0749   BP: 144/68   Pulse: 58   Resp: 16   Temp: 36.6 °C (97.9 °F)   SpO2: 97%     Body mass index is 27.44 kg/m².    Patient is AOx3 and in no distress. Patient is alert and cooperative. Sitting comfortably in bed with dressings clean, dry and intact.     Exam deferred at this time due to post-operative dressings staying intact.    LABS:   Results for orders placed or performed during the hospital encounter of 03/14/24 (from the past 24 hour(s))   Basic Metabolic Panel   Result Value Ref Range    Glucose 124 (H) 65 - 99 mg/dL    Sodium 140 133 - 145 mmol/L    Potassium 4.6 3.4 - 5.1 mmol/L    Chloride 102 97 - 107 mmol/L    Bicarbonate 25 24 - 31 mmol/L    Urea Nitrogen 32 (H) 8 - 25 mg/dL    Creatinine 0.60 0.40 - 1.60 mg/dL    eGFR >90 >60 mL/min/1.73m*2    Calcium 8.9 8.5 - 10.4 mg/dL    Anion Gap 13 <=19 mmol/L   CBC and Auto Differential   Result Value Ref Range    WBC 5.1 4.4 - 11.3 x10*3/uL    nRBC 0.0 0.0 - 0.0 /100 WBCs    RBC 3.86 (L) 4.00 -  5.20 x10*6/uL    Hemoglobin 11.5 (L) 12.0 - 16.0 g/dL    Hematocrit 36.4 36.0 - 46.0 %    MCV 94 80 - 100 fL    MCH 29.8 26.0 - 34.0 pg    MCHC 31.6 (L) 32.0 - 36.0 g/dL    RDW 14.3 11.5 - 14.5 %    Platelets 283 150 - 450 x10*3/uL    Neutrophils % 48.9 40.0 - 80.0 %    Immature Granulocytes %, Automated 0.6 0.0 - 0.9 %    Lymphocytes % 30.7 13.0 - 44.0 %    Monocytes % 9.6 2.0 - 10.0 %    Eosinophils % 9.2 0.0 - 6.0 %    Basophils % 1.0 0.0 - 2.0 %    Neutrophils Absolute 2.51 1.20 - 7.70 x10*3/uL    Immature Granulocytes Absolute, Automated 0.03 0.00 - 0.70 x10*3/uL    Lymphocytes Absolute 1.57 1.20 - 4.80 x10*3/uL    Monocytes Absolute 0.49 0.10 - 1.00 x10*3/uL    Eosinophils Absolute 0.47 0.00 - 0.70 x10*3/uL    Basophils Absolute 0.05 0.00 - 0.10 x10*3/uL        Lab Results   Component Value Date    HGBA1C 5.8 (H) 03/14/2024      Lab Results   Component Value Date    CRP 11.6 (H) 11/15/2020      Lab Results   Component Value Date    SEDRATE 37 (H) 11/16/2020        Results from last 7 days   Lab Units 03/19/24  0528   WBC AUTO x10*3/uL 5.1   RBC AUTO x10*6/uL 3.86*   HEMOGLOBIN g/dL 11.5*   HEMATOCRIT % 36.4     Results from last 7 days   Lab Units 03/19/24  0528 03/15/24  0830 03/14/24  1657   SODIUM mmol/L 140   < > 136   POTASSIUM mmol/L 4.6   < > 4.2   CHLORIDE mmol/L 102   < > 97   CO2 mmol/L 25   < > 26   BUN mg/dL 32*   < > 16   CREATININE mg/dL 0.60   < > 0.40   CALCIUM mg/dL 8.9   < > 9.3   BILIRUBIN TOTAL mg/dL  --   --  <0.2   ALT U/L  --   --  13   AST U/L  --   --  15    < > = values in this interval not displayed.           IMAGING REVIEW:  XR foot right 3+ views    Result Date: 3/14/2024  Interpreted By:  Slava Masters, STUDY: XR FOOT RIGHT 3+ VIEWS; 3/14/2024 5:11 pm   INDICATION: Signs/Symptoms:pain and possible abscess. Pain   COMPARISON: 03/05/2024   ACCESSION NUMBER(S): [id number]   ORDERING CLINICIAN: DAV MCCORD   TECHNIQUE: Views: Right foot AP, Lat, Oblique   FINDINGS: No  evidence for acute fracture or dislocation. 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 No definite bony erosion to suggest osteomyelitis. No subcutaneous gas is seen.       No evidence for acute osseous abnormality. No bony erosion to suggest osteomyelitis. No subcutaneous gas is seen.   Signed by: Slava Masters 3/14/2024 5:28 PM Dictation workstation:   [id number]    XR foot right 3+ views    Result Date: 3/5/2024  Interpreted By:  Naveen Stiles, STUDY: XR FOOT RIGHT 3+ VIEWS; ;  3/5/2024 3:29 pm   INDICATION: Signs/Symptoms:Possible osteomylitis.   COMPARISON: 12/06/2019   ACCESSION NUMBER(S): [id number]   ORDERING CLINICIAN: LENO DIEZ   FINDINGS: The mutations through the distal metatarsal bones. No new focal lytic lesion or abnormal periosteal reaction is visualized to suggest acute osteomyelitis. There is pes planus deformity. Calcaneal spur is present. There is mild diffuse soft tissue swelling.       Previous amputations through the distal metatarsal bones. Diffuse soft tissue swelling without radiographic evidence of acute osteomyelitis. Correlate clinically and follow-up as needed.   Signed by: Naveen Stiles 3/5/2024 4:47 PM Dictation workstation:   [id number]            Assessment/Plan   ASSESSMENT & PLAN:    #Cellulitis, Right Foot  #Abscess, Right Foot  #Full Thickness Ulceration down to the level of Bone, Right Plantar Foot  #Full Thickness Ulceration down to the level of Subcutaneous Tissue, Left Plantar Foot    - Patient was seen and evaluated; all findings were discussed and all questions were answered to patient's satisfaction.  - Charts, labs, vitals and imaging all reviewed.   - Labs: Glucose 124, WBC 5.1,  - Intra-Op wound cultures and Bone Biopsy Preliminary results are negative for infection and Osteomyelitis. Discussed this with patient. Awaiting final results.    Plan:  - Abx: Per ID  - Pain Regimen: Per Medicine  - Bowel  Regimen: Per Medicine  - Discussed patient's disposition at this time. Patient will be cleared for discharge once final bone biopsy results return.  - Nursing staff is able to change/reinforce dressing if & as necessary until next day’s dressing change. Thank you.  - Podiatry will continue to follow while in house.    Case to be discussed with attending, A&P above reflects a tentative plan. Please await for the final signature from the attending physician on service.    Santos Gan DPM, PGY-2  Podiatric Medicine & Surgery  Please Guille message me with any questions or concerns.            SIGNATURE: Santos Gan DPM PATIENT NAME: Regina Flaherty   DATE: March 19, 2024 MRN: [id number]   TIME: 12:48 PM CONTACT: Haiku message

## 2024-03-19 NOTE — PROGRESS NOTES
"   03/19/24 7779   Discharge Planning   Patient expects to be discharged to: Home     Met with patient at bedside to discuss discharge planning.  Declining HHC. Patient stated, \" I wouldn't sign up for it even if they told me I had to\".  "

## 2024-03-20 VITALS
OXYGEN SATURATION: 98 % | HEIGHT: 66 IN | HEART RATE: 67 BPM | DIASTOLIC BLOOD PRESSURE: 75 MMHG | RESPIRATION RATE: 16 BRPM | BODY MASS INDEX: 27.32 KG/M2 | TEMPERATURE: 97.9 F | WEIGHT: 170 LBS | SYSTOLIC BLOOD PRESSURE: 154 MMHG

## 2024-03-20 DIAGNOSIS — L03.115 CELLULITIS OF RIGHT LOWER LIMB: Primary | ICD-10-CM

## 2024-03-20 LAB
ANION GAP SERPL CALC-SCNC: 11 MMOL/L
BACTERIA SPEC CULT: NORMAL
BASOPHILS # BLD AUTO: 0.07 X10*3/UL (ref 0–0.1)
BASOPHILS NFR BLD AUTO: 1.5 %
BUN SERPL-MCNC: 31 MG/DL (ref 8–25)
CALCIUM SERPL-MCNC: 8.8 MG/DL (ref 8.5–10.4)
CHLORIDE SERPL-SCNC: 102 MMOL/L (ref 97–107)
CO2 SERPL-SCNC: 25 MMOL/L (ref 24–31)
CREAT SERPL-MCNC: 0.7 MG/DL (ref 0.4–1.6)
EGFRCR SERPLBLD CKD-EPI 2021: >90 ML/MIN/1.73M*2
EOSINOPHIL # BLD AUTO: 0.51 X10*3/UL (ref 0–0.7)
EOSINOPHIL NFR BLD AUTO: 10.8 %
ERYTHROCYTE [DISTWIDTH] IN BLOOD BY AUTOMATED COUNT: 14.3 % (ref 11.5–14.5)
GLUCOSE SERPL-MCNC: 138 MG/DL (ref 65–99)
GRAM STN SPEC: NORMAL
GRAM STN SPEC: NORMAL
HCT VFR BLD AUTO: 37.9 % (ref 36–46)
HGB BLD-MCNC: 11.9 G/DL (ref 12–16)
IMM GRANULOCYTES # BLD AUTO: 0.03 X10*3/UL (ref 0–0.7)
IMM GRANULOCYTES NFR BLD AUTO: 0.6 % (ref 0–0.9)
LABORATORY COMMENT REPORT: NORMAL
LYMPHOCYTES # BLD AUTO: 1.52 X10*3/UL (ref 1.2–4.8)
LYMPHOCYTES NFR BLD AUTO: 32.2 %
MCH RBC QN AUTO: 30.1 PG (ref 26–34)
MCHC RBC AUTO-ENTMCNC: 31.4 G/DL (ref 32–36)
MCV RBC AUTO: 96 FL (ref 80–100)
MONOCYTES # BLD AUTO: 0.53 X10*3/UL (ref 0.1–1)
MONOCYTES NFR BLD AUTO: 11.2 %
NEUTROPHILS # BLD AUTO: 2.06 X10*3/UL (ref 1.2–7.7)
NEUTROPHILS NFR BLD AUTO: 43.7 %
NRBC BLD-RTO: 0 /100 WBCS (ref 0–0)
PATH REPORT.FINAL DX SPEC: NORMAL
PATH REPORT.GROSS SPEC: NORMAL
PATH REPORT.RELEVANT HX SPEC: NORMAL
PATH REPORT.TOTAL CANCER: NORMAL
PLATELET # BLD AUTO: 293 X10*3/UL (ref 150–450)
POTASSIUM SERPL-SCNC: 4.1 MMOL/L (ref 3.4–5.1)
RBC # BLD AUTO: 3.95 X10*6/UL (ref 4–5.2)
SODIUM SERPL-SCNC: 138 MMOL/L (ref 133–145)
WBC # BLD AUTO: 4.7 X10*3/UL (ref 4.4–11.3)

## 2024-03-20 PROCEDURE — 2500000001 HC RX 250 WO HCPCS SELF ADMINISTERED DRUGS (ALT 637 FOR MEDICARE OP)

## 2024-03-20 PROCEDURE — 2500000001 HC RX 250 WO HCPCS SELF ADMINISTERED DRUGS (ALT 637 FOR MEDICARE OP): Performed by: NURSE PRACTITIONER

## 2024-03-20 PROCEDURE — 2500000002 HC RX 250 W HCPCS SELF ADMINISTERED DRUGS (ALT 637 FOR MEDICARE OP, ALT 636 FOR OP/ED): Performed by: INTERNAL MEDICINE

## 2024-03-20 PROCEDURE — 97161 PT EVAL LOW COMPLEX 20 MIN: CPT | Mod: GP

## 2024-03-20 PROCEDURE — 36415 COLL VENOUS BLD VENIPUNCTURE: CPT | Performed by: NURSE PRACTITIONER

## 2024-03-20 PROCEDURE — 85025 COMPLETE CBC W/AUTO DIFF WBC: CPT | Performed by: NURSE PRACTITIONER

## 2024-03-20 PROCEDURE — 80048 BASIC METABOLIC PNL TOTAL CA: CPT | Performed by: NURSE PRACTITIONER

## 2024-03-20 RX ORDER — SULFAMETHOXAZOLE AND TRIMETHOPRIM 800; 160 MG/1; MG/1
1 TABLET ORAL EVERY 12 HOURS
Qty: 14 TABLET | Refills: 0 | Status: SHIPPED | OUTPATIENT
Start: 2024-03-20 | End: 2024-03-27

## 2024-03-20 RX ORDER — LANOLIN ALCOHOL/MO/W.PET/CERES
50 CREAM (GRAM) TOPICAL DAILY
Qty: 90 TABLET | Refills: 0 | Status: SHIPPED | OUTPATIENT
Start: 2024-03-21

## 2024-03-20 RX ORDER — PANTOPRAZOLE SODIUM 40 MG/1
40 TABLET, DELAYED RELEASE ORAL DAILY
Qty: 90 TABLET | Refills: 0 | Status: SHIPPED | OUTPATIENT
Start: 2024-03-21

## 2024-03-20 RX ORDER — SULFAMETHOXAZOLE AND TRIMETHOPRIM 800; 160 MG/1; MG/1
1 TABLET ORAL EVERY 12 HOURS
Status: DISCONTINUED | OUTPATIENT
Start: 2024-03-20 | End: 2024-03-20 | Stop reason: HOSPADM

## 2024-03-20 RX ADMIN — ASPIRIN 81 MG: 81 TABLET, COATED ORAL at 09:25

## 2024-03-20 RX ADMIN — SULFAMETHOXAZOLE AND TRIMETHOPRIM 1 TABLET: 800; 160 TABLET ORAL at 15:42

## 2024-03-20 RX ADMIN — LINEZOLID 600 MG: 600 TABLET, FILM COATED ORAL at 09:25

## 2024-03-20 RX ADMIN — OXYCODONE HYDROCHLORIDE AND ACETAMINOPHEN 1 TABLET: 5; 325 TABLET ORAL at 08:01

## 2024-03-20 RX ADMIN — OXYCODONE HYDROCHLORIDE AND ACETAMINOPHEN 1 TABLET: 5; 325 TABLET ORAL at 12:20

## 2024-03-20 RX ADMIN — BISACODYL 5 MG: 5 TABLET, COATED ORAL at 09:29

## 2024-03-20 RX ADMIN — OXYCODONE HYDROCHLORIDE AND ACETAMINOPHEN 1 TABLET: 5; 325 TABLET ORAL at 03:47

## 2024-03-20 ASSESSMENT — PAIN - FUNCTIONAL ASSESSMENT
PAIN_FUNCTIONAL_ASSESSMENT: 0-10

## 2024-03-20 ASSESSMENT — COGNITIVE AND FUNCTIONAL STATUS - GENERAL
MOVING TO AND FROM BED TO CHAIR: A LITTLE
WALKING IN HOSPITAL ROOM: A LITTLE
STANDING UP FROM CHAIR USING ARMS: A LITTLE
CLIMB 3 TO 5 STEPS WITH RAILING: A LITTLE
MOBILITY SCORE: 20

## 2024-03-20 ASSESSMENT — PAIN DESCRIPTION - ORIENTATION
ORIENTATION: RIGHT
ORIENTATION: RIGHT

## 2024-03-20 ASSESSMENT — PAIN DESCRIPTION - LOCATION
LOCATION: FOOT
LOCATION: FOOT

## 2024-03-20 ASSESSMENT — PAIN SCALES - GENERAL
PAINLEVEL_OUTOF10: 0 - NO PAIN
PAINLEVEL_OUTOF10: 7
PAINLEVEL_OUTOF10: 1
PAINLEVEL_OUTOF10: 7

## 2024-03-20 ASSESSMENT — ACTIVITIES OF DAILY LIVING (ADL): ADL_ASSISTANCE: INDEPENDENT

## 2024-03-20 NOTE — NURSING NOTE
Assumed care of patient, patient is in bed awake and call light and possessions within reach and has nursing student today.

## 2024-03-20 NOTE — PROGRESS NOTES
"   03/20/24 0829   Discharge Planning   Who is requesting discharge planning? Provider   Home or Post Acute Services None   Patient expects to be discharged to: Home-pt refusing HHC   Does the patient need discharge transport arranged? No     TCC discussed DC planning last evening with pt. Per RN TCC -  Pt stated, \" I wouldn't sign up for it even if they told me I had to\".               Safe dc plan secured home no skilled needs-pt can go  "

## 2024-03-20 NOTE — PROGRESS NOTES
INFECTIOUS DISEASES PROGRESS NOTE    Consulted / following patient for:  Right foot abscess and wound infection    Subjective   Interval History:   No postoperative complaints, anxious to be discharged     Objective   PHYSICAL EXAMINATION  Vital signs:  Visit Vitals  /86 (BP Location: Right arm, Patient Position: Sitting)   Pulse 60   Temp 36.9 °C (98.4 °F) (Oral)   Resp 16      General: No acute distress  Extremities: Postoperative dressings in place, not removed by me today.  No proximal erythema    Relevant Results  WBC: 4700     Results from last 72 hours   Lab Units 03/20/24  0511   CREATININE mg/dL 0.70   ANION GAP mmol/L 11   EGFR mL/min/1.73m*2 >90     Microbiology:  Blood (3/14): Negative X2  Wound (3/5): MSSA  Wound (3/14): MSSA  Wound, OR (3/18): 1+ Staph aureus from the wound, no growth from bone    Histopathology (3/18): Negative for osteomyelitis    Imaging:  Right foot (3/14): No subcutaneous gas or bony erosion      ASSESSMENT:  Right foot abscess and wound infection.  Now post-operative day 2 after incision and drainage, biopsy, allograft.  It appears that all infected material has been resected, with a clean margin clinically and histopathologically.  I have been in communication by text message with Dr. Magallon  Extensive antibiotic allergies  Chronic left foot ulcer - not infected     SUGGESTIONS:  Discharge on Bactrim DS 1 twice daily for 7 days (I will send a prescription to her community pharmacy)  Follow-up with me prn as discussed  WILL SIGN OFF.  PLEASE RE-CONSULT PRN.  THANK YOU.    Keith Ramos MD  [facility]  Office:  [phone]

## 2024-03-20 NOTE — PROGRESS NOTES
Regina Flaherty is a 63 y.o. female on day 6 of admission presenting with Cellulitis of right lower extremity.      Subjective   Seen examined sitting on edge of bed.  She was complaining about how uncomfortable her offloading shoes were.  She is anxious to go home and would like to go home today if possible.  Denies any chest pain, shortness of breath, or abdominal pain.  She further denies any headache, dizziness, nausea/vomiting.       Objective     Last Recorded Vitals  /86 (BP Location: Right arm, Patient Position: Sitting)   Pulse 60   Temp 36.9 °C (98.4 °F) (Oral)   Resp 16   Wt 77.1 kg (170 lb)   SpO2 98%   Intake/Output last 3 Shifts:    Intake/Output Summary (Last 24 hours) at 3/20/2024 0918  Last data filed at 3/20/2024 0800  Gross per 24 hour   Intake 360 ml   Output --   Net 360 ml       Admission Weight  Weight: 77.1 kg (170 lb) (03/14/24 1410)    Daily Weight  03/14/24 : 77.1 kg (170 lb)          Physical Exam  Constitutional: No acute distress, calm, cooperative  Cardiovascular: Regular rhythm and rate, no MGR  Respiratory: Lungs clear to auscultation,   Gastrointestinal: Bowel sounds positive x 4, soft, nontender  Neurologic: Alert and oriented x 3 equal strength bilaterally  Musculoskeletal: Able to move all extremities, no edema  Skin: Bilateral foot dressings dry and intact, offloading shoes in place               Assessment/Plan                  Principal Problem:    Cellulitis of right lower extremity  Active Problems:    Cellulitis and abscess of foot    Osteomyelitis of ankle and foot (CMS/HCC)  Cellulitis and abscess of right foot/left foot ulcer  .  Status post: Postop day 2  #1 Right lower extremity Incision and drainage of abscess (10060)  #2 Right lower extremity bone biopsy (20245)   #3 Left lower extremity preparation of wound bed   #4 Left lower extremity application of allograft <10 square cm   .  PT/OT  .  Infectious diseases following continue linezolid and pyridoxine  .   Awaiting final culture results most likely go home on Bactrim  .  Podiatry following  .  Wound care following     History of diabetes mellitus  .  Not on medication  .  Patient states she no longer has diabetes  .  Hemoglobin A1c is 5.8     DVT prophylaxis  .  Subcutaneous heparin     Disposition  .  Most likely discharge home today once cleared by ID and podiatry                BRYAN Goff-CNP

## 2024-03-20 NOTE — DISCHARGE SUMMARY
Discharge Diagnosis  Cellulitis of right lower extremity    Issues Requiring Follow-Up  Follow-up with infectious diseases in 2 weeks Dr. Ramos  Follow-up with podiatry in 2 weeks  Follow-up with primary care provider in 1 to 2 weeks    Discharge Meds     Your medication list        START taking these medications        Instructions Last Dose Given Next Dose Due   pantoprazole 40 mg EC tablet  Commonly known as: ProtoNix  Start taking on: March 21, 2024      Take 1 tablet (40 mg) by mouth once daily. Do not crush, chew, or split. Do not start before March 21, 2024.       pyridoxine 50 mg tablet  Commonly known as: Vitamin B-6  Start taking on: March 21, 2024      Take 1 tablet (50 mg) by mouth once daily. Do not start before March 21, 2024.       sulfamethoxazole-trimethoprim 800-160 mg tablet  Commonly known as: Bactrim DS      Take 1 tablet by mouth every 12 hours for 14 doses.              CONTINUE taking these medications        Instructions Last Dose Given Next Dose Due   acetaminophen 650 mg ER tablet  Commonly known as: Tylenol 8 HOUR           ascorbic acid 1,000 mg tablet  Commonly known as: Vitamin C           aspirin 81 mg EC tablet           biotin 5 mg capsule           coenzyme Q10 100 mg tablet           COLLAGEN SKIN RENEWAL ORAL           cyanocobalamin 500 mcg tablet  Commonly known as: Vitamin B-12           GLUCOSAMINE-CHONDROITIN ORAL           multivitamin with minerals iron-free  Commonly known as: Centrum Silver                     Where to Get Your Medications        These medications were sent to [id number] - [place] [address] [facility] [address]  [address], [place] [address] [facility] [address]      Phone: [phone]   pantoprazole 40 mg EC tablet  pyridoxine 50 mg tablet  sulfamethoxazole-trimethoprim 800-160 mg tablet         Test Results Pending At Discharge  Pending Labs       Order Current Status    Tissue/Wound Culture/Smear Preliminary result            Hospital Course      Regina Flaherty is a 63 y.o. female presenting with cellulitis of right lower extremity.     53-year-old female presents to [facility] for chief complaint of cellulitis of right lower extremity.  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     Patient states that 2 weeks ago, her dog jumped on her right foot and she subsequently developed erythema and edema on the dorsal aspect of her right foot.  She states that she visited her PCP who gave her Keflex which she developed rash to.  She was subsequently given Bactrim and finished this course of antibiotics.     However, today, when she visited the wound care clinic for follow-up clinic, she was sent to emergency room when it was noted that her right lower extremity was erythematous, edematous and was concern of abscess and cellulitis.     She will be admitted under the hospitalist service for further evaluation and treatment.    She underwent bilateral foot debridements and is postop day 2 today seen by podiatry and infectious diseases and will discharge home on a course of Bactrim and follow-up with podiatry and infectious diseases  Pertinent Physical Exam At Time of Discharge  Physical Exam    Outpatient Follow-Up  No future appointments.      Sachin Anna, APRN-CNP

## 2024-03-20 NOTE — PROGRESS NOTES
PODIATRY PROGRESS NOTE    SERVICE DATE: 3/20/2024   SERVICE TIME:  1:02 PM    HPI:  Patient sitting comfortably in bed today. The patient relates no pain at this time. Post-operative dressings are intact. She denies all constitutional symptoms. No other complaints. S/P Day 2 Right Foot Bone Biopsy, I&D and Left Foot Wound Debridement and Integra Bi-Layer Graft Application.    Past Medical History:   Diagnosis Date    Arthritis     Diabetes mellitus (CMS/HCC)      Past Surgical History:   Procedure Laterality Date    OTHER SURGICAL HISTORY  2019     section    OTHER SURGICAL HISTORY  2019    Hysterectomy    OTHER SURGICAL HISTORY  2019    Cystocele repair    OTHER SURGICAL HISTORY  2019    Mid-urethral sling insertion    OTHER SURGICAL HISTORY  2019    Abdominal panniculectomy    OTHER SURGICAL HISTORY  2019    Foot surgery    OTHER SURGICAL HISTORY  11/15/2019    Sacrocolpopexy    OTHER SURGICAL HISTORY  11/15/2019    Rectocele repair     No family history on file.  Social History     Tobacco Use    Smoking status: Never    Smokeless tobacco: Never   Substance Use Topics    Alcohol use: Never    Drug use: Never      Medications Prior to Admission   Medication Sig Dispense Refill Last Dose    acetaminophen (Tylenol 8 HOUR) 650 mg ER tablet Take 1 tablet (650 mg) by mouth every 8 hours if needed for mild pain (1 - 3). Do not crush, chew, or split.   3/14/2024    ascorbic acid (Vitamin C) 1,000 mg tablet Take 2 tablets (2,000 mg) by mouth once daily.   3/14/2024    ascorbic acid/collagen hydr (COLLAGEN SKIN RENEWAL ORAL) Take 2,500 mg by mouth once daily.   3/14/2024    aspirin 81 mg EC tablet Take 1 tablet (81 mg) by mouth once daily.   3/14/2024    biotin 5 mg capsule Take 2 capsules (10 mg) by mouth once daily.   3/14/2024    co-enzyme Q-10 50 mg capsule Take 2 capsules by mouth once daily.   3/14/2024    cyanocobalamin (Vitamin B-12) 500 mcg tablet Take 1 tablet (500 mcg)  by mouth once daily.   3/14/2024    glucosamine HCl/chondroitin snell (GLUCOSAMINE-CHONDROITIN ORAL) Take 2 tablets by mouth once daily. 1500/1200   3/14/2024    multivitamin with minerals iron-free (Centrum Silver) Take 1 tablet by mouth once daily.   3/14/2024        Medications:  Scheduled Meds: aspirin, 81 mg, oral, Daily  heparin, 5,000 Units, subcutaneous, q12h  linezolid, 600 mg, oral, q12h GIGI  pantoprazole, 40 mg, oral, Daily  pyridoxine, 50 mg, oral, Daily      Continuous Infusions:    PRN Meds: PRN medications: acetaminophen, bisacodyl, diphenhydrAMINE, LORazepam, ondansetron, oxyCODONE-acetaminophen, polyethylene glycol    Allergies as of 03/14/2024 - Reviewed 03/14/2024   Allergen Reaction Noted    Vancomycin Anaphylaxis 03/14/2024    Zosyn [piperacillin-tazobactam] Anaphylaxis 03/14/2024    Doxycycline Rash 03/14/2024    Keflex [cephalexin] Rash 03/14/2024            Objective   PHYSICAL EXAM:  Physical Exam Performed:  Vitals:    03/20/24 0700   BP: 142/86   Pulse: 60   Resp: 16   Temp: 36.9 °C (98.4 °F)   SpO2: 98%     Body mass index is 27.44 kg/m².    Patient is AOx3 and in no acute distress. Patient is alert and cooperative. Sitting comfortably in bed with dressings clean, dry and intact.      Vascular: Palpable DP/PT pulses B/L. Minimal non-pitting edema noted B/L. Hair growth absent B/L. CFT<5 to B/L hallux. Temperature is warm to warm from tibial tuberosity to distal digits B/L. No erythema or lymphatic streaking noted.     Musculoskeletal: Gross active and passive ROM intact to age and activity level. Moves all extremities spontaneously. Minimal pain to palpation at feet B/L.      Neurological: Intact light touch sensation B/L. Pain stimuli diminished B/L. Denies any numbness, burning or tingling.     Dermatologic: Skin appears waxy B/L.  No rashes or nodules noted B/L. No hyperkeratotic tissue noted B/L. Right Foot surgical incision noted with packing strip. Left Foot Plantar Foot wound with  overlying intact Integra Bi-Layer graft. Sutures in place. No drainage. No malodor. No SOI.    LABS:   Results for orders placed or performed during the hospital encounter of 03/14/24 (from the past 24 hour(s))   Basic Metabolic Panel   Result Value Ref Range    Glucose 138 (H) 65 - 99 mg/dL    Sodium 138 133 - 145 mmol/L    Potassium 4.1 3.4 - 5.1 mmol/L    Chloride 102 97 - 107 mmol/L    Bicarbonate 25 24 - 31 mmol/L    Urea Nitrogen 31 (H) 8 - 25 mg/dL    Creatinine 0.70 0.40 - 1.60 mg/dL    eGFR >90 >60 mL/min/1.73m*2    Calcium 8.8 8.5 - 10.4 mg/dL    Anion Gap 11 <=19 mmol/L   CBC and Auto Differential   Result Value Ref Range    WBC 4.7 4.4 - 11.3 x10*3/uL    nRBC 0.0 0.0 - 0.0 /100 WBCs    RBC 3.95 (L) 4.00 - 5.20 x10*6/uL    Hemoglobin 11.9 (L) 12.0 - 16.0 g/dL    Hematocrit 37.9 36.0 - 46.0 %    MCV 96 80 - 100 fL    MCH 30.1 26.0 - 34.0 pg    MCHC 31.4 (L) 32.0 - 36.0 g/dL    RDW 14.3 11.5 - 14.5 %    Platelets 293 150 - 450 x10*3/uL    Neutrophils % 43.7 40.0 - 80.0 %    Immature Granulocytes %, Automated 0.6 0.0 - 0.9 %    Lymphocytes % 32.2 13.0 - 44.0 %    Monocytes % 11.2 2.0 - 10.0 %    Eosinophils % 10.8 0.0 - 6.0 %    Basophils % 1.5 0.0 - 2.0 %    Neutrophils Absolute 2.06 1.20 - 7.70 x10*3/uL    Immature Granulocytes Absolute, Automated 0.03 0.00 - 0.70 x10*3/uL    Lymphocytes Absolute 1.52 1.20 - 4.80 x10*3/uL    Monocytes Absolute 0.53 0.10 - 1.00 x10*3/uL    Eosinophils Absolute 0.51 0.00 - 0.70 x10*3/uL    Basophils Absolute 0.07 0.00 - 0.10 x10*3/uL        Lab Results   Component Value Date    HGBA1C 5.8 (H) 03/14/2024      Lab Results   Component Value Date    CRP 11.6 (H) 11/15/2020      Lab Results   Component Value Date    SEDRATE 37 (H) 11/16/2020        Results from last 7 days   Lab Units 03/20/24  0511   WBC AUTO x10*3/uL 4.7   RBC AUTO x10*6/uL 3.95*   HEMOGLOBIN g/dL 11.9*   HEMATOCRIT % 37.9     Results from last 7 days   Lab Units 03/20/24  0511 03/15/24  0830 03/14/24  5516    SODIUM mmol/L 138   < > 136   POTASSIUM mmol/L 4.1   < > 4.2   CHLORIDE mmol/L 102   < > 97   CO2 mmol/L 25   < > 26   BUN mg/dL 31*   < > 16   CREATININE mg/dL 0.70   < > 0.40   CALCIUM mg/dL 8.8   < > 9.3   BILIRUBIN TOTAL mg/dL  --   --  <0.2   ALT U/L  --   --  13   AST U/L  --   --  15    < > = values in this interval not displayed.           IMAGING REVIEW:  XR foot right 3+ views    Result Date: 3/14/2024  Interpreted By:  Slava Masters, STUDY: XR FOOT RIGHT 3+ VIEWS; 3/14/2024 5:11 pm   INDICATION: Signs/Symptoms:pain and possible abscess. Pain   COMPARISON: 03/05/2024   ACCESSION NUMBER(S): [id number]   ORDERING CLINICIAN: DAV MCCORD   TECHNIQUE: Views: Right foot AP, Lat, Oblique   FINDINGS: No evidence for acute fracture or dislocation. 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 No definite bony erosion to suggest osteomyelitis. No subcutaneous gas is seen.       No evidence for acute osseous abnormality. No bony erosion to suggest osteomyelitis. No subcutaneous gas is seen.   Signed by: Slava Masters 3/14/2024 5:28 PM Dictation workstation:   [id number]    XR foot right 3+ views    Result Date: 3/5/2024  Interpreted By:  Naveen Stiles, STUDY: XR FOOT RIGHT 3+ VIEWS; ;  3/5/2024 3:29 pm   INDICATION: Signs/Symptoms:Possible osteomylitis.   COMPARISON: 12/06/2019   ACCESSION NUMBER(S): [id number]   ORDERING CLINICIAN: LENO DIEZ   FINDINGS: The mutations through the distal metatarsal bones. No new focal lytic lesion or abnormal periosteal reaction is visualized to suggest acute osteomyelitis. There is pes planus deformity. Calcaneal spur is present. There is mild diffuse soft tissue swelling.       Previous amputations through the distal metatarsal bones. Diffuse soft tissue swelling without radiographic evidence of acute osteomyelitis. Correlate clinically and follow-up as needed.   Signed by: Naveen Stiles 3/5/2024 4:47 PM  Dictation workstation:   [id number]            Assessment/Plan   ASSESSMENT & PLAN:    #Cellulitis, Right Foot, Resolved  #Abscess, Right Foot, Resolved  #Full Thickness Ulceration down to the level of Bone, Right Plantar Foot  #Full Thickness Ulceration down to the level of Subcutaneous Tissue, Left Plantar Foot    - Patient was seen and evaluated; all findings were discussed and all questions were answered to patient's satisfaction.  - Charts, labs, vitals and imaging all reviewed.   - Labs: Glucose 139, WBC 4.7,  - Intra-Op wound cultures Preliminary results are positive for 1+ rare Staph Aureus. Bone Biopsy Final Results are negative for Osteomyelitis. Discussed this with patient.    Plan:  - Abx: Per ID, awaiting clearance for discharge.  - Pain Regimen: Per Medicine  - Bowel Regimen: Per Medicine  - Dressing changed today at bedside, right foot surgical incision unpacked and steri-stripped at this time.  - Patient is cleared for discharge from Podiatry standpoint.   - [facility] with Dr. Magallon.     Case to be discussed with attending, A&P above reflects a tentative plan. Please await for the final signature from the attending physician on service.    Santos Gan DPM, PGY-2  Podiatric Medicine & Surgery  Please Dalton[employer] message me with any questions or concerns.            SIGNATURE: Santos Gan DPM PATIENT NAME: Regina Flaherty   DATE: March 20, 2024 MRN: [id number]   TIME: 1:02 PM CONTACT: Haiku message

## 2024-03-20 NOTE — PROGRESS NOTES
Physical Therapy    Physical Therapy Evaluation & Treatment    Patient Name: Regina Flaherty  MRN: [id number]  Today's Date: 3/20/2024   Time Calculation  Start Time: 0827  Stop Time: 0840  Time Calculation (min): 13 min    Assessment/Plan   PT Assessment  PT Assessment Results: Decreased strength, Decreased range of motion, Decreased endurance, Impaired balance, Decreased mobility, Decreased safety awareness, Orthopedic restrictions  Rehab Prognosis: Good  Evaluation/Treatment Tolerance: Patient tolerated treatment well  Medical Staff Made Aware: Yes  Strengths: Premorbid level of function, Insight into problems  Barriers to Participation: Comorbidities  End of Session Communication: Bedside nurse  Assessment Comment: Pt is a 63 y.o. female who presents with decreased mobility and balance s/p BLE surgical intervention. Pt would benefit from continued therapy services but declined further services during hospital stay despite discussion with PT about benefits. Will d/c from PT.  End of Session Patient Position: Bed, 2 rail up, Alarm off, not on at start of session   IP OR SWING BED PT PLAN  Inpatient or Swing Bed: Inpatient  PT Plan  PT Plan: PT Eval only  PT Eval Only Reason: Patient/family refusal  PT Frequency: PT eval only  PT Discharge Recommendations: No further acute PT  PT Recommended Transfer Status: Assist x1 (for safety)  PT - OK to Discharge: Yes      Subjective     General Visit Information:  General  Reason for Referral: Impaired mobility  Referred By: Krista Chapman MD  Past Medical History Relevant to Rehab: Arthritis, DM, R TMA  Missed Visit: No  Family/Caregiver Present: No  Caregiver Feedback: n/a  Prior to Session Communication: Bedside nurse  Patient Position Received: Bed, 2 rail up, Alarm off, not on at start of session  Preferred Learning Style: verbal  General Comment: pt is a 63 y.o. female who reported to the hospital for R LE cellulitis. Pt seen by podiatry and is now S/P RLE incision  and drainage with bone biopsy; LLE wound debridement w application of wound allograft.  Home Living:  Home Living  Type of Home: House  Lives With: Spouse, Adult children (daughter)  Home Adaptive Equipment: None  Home Layout: Bed/bath upstairs, Two level  Home Access: Level entry  Bathroom Shower/Tub: Tub/shower unit  Bathroom Equipment: Grab bars in shower  Prior Level of Function:  Prior Function Per Pt/Caregiver Report  Level of [place]: Independent with ADLs and functional transfers, Independent with homemaking with ambulation  ADL Assistance: Independent  Homemaking Assistance: Independent  Ambulatory Assistance: Independent  Vocational: Full time employment ([employer])  Hand Dominance: Right  Precautions:  Precautions  Hearing/Visual Limitations: n/a  LE Weight Bearing Status: Heel Weight Bearing in Post-Op Shoe, Weight Bearing as Tolerated (R/L)  Medical Precautions: Fall precautions  Precautions Comment: Applied post-op heel WB shoe (pt able to don BLE post-op shoes independently)    Objective   Pain:  Pain Assessment  Pain Assessment: 0-10  Pain Score: 0 - No pain  Cognition:  Cognition  Overall Cognitive Status: Within Functional Limits  Safety/Judgement: Exceptions to WFL  Insight: Mild (pt states she may not wear her postop shoe all the time after encouragement from PT.)    General Assessments:  General Observation  General Observation: pt agreeable to PT eval upon encouragement. Bilateral feet bandaged.     Activity Tolerance  Endurance: Tolerates 10 - 20 min exercise with multiple rests    Sensation  Light Touch: No apparent deficits  Sensation Comment: pt denies paresthesias in R/L UE/LE    Strength  Strength Comments: pt demonstrates >3+/5 BLE  Strength  Strength Comments: pt demonstrates >3+/5 BLE    Coordination  Movements are Fluid and Coordinated: Yes    Postural Control  Postural Control: Within Functional Limits    Static Sitting Balance  Static Sitting-Balance Support: Feet unsupported, No  upper extremity supported  Static Sitting-Level of Assistance: Independent  Static Sitting-Comment/Number of Minutes: pt sat EOB >5mins independently    Static Standing Balance  Static Standing-Balance Support: Bilateral upper extremity supported  Static Standing-Level of Assistance: Close supervision  Static Standing-Comment/Number of Minutes: pt stood EOB with Heel WB post-op shoe with close supervision while holding on to bed rail and tray. Declined assistance from PT.  Functional Assessments:  Bed Mobility  Bed Mobility: Yes  Bed Mobility 1  Bed Mobility 1: Supine to sitting, Sitting to supine  Level of Assistance 1: Modified independent  Bed Mobility Comments 1: pt performed supine<>sit modified independent while utilizing bedrails for forward trunk lean and scooting to EOB.    Transfers  Transfer: Yes  Transfer 1  Transfer From 1: Sit to, Stand to  Transfer to 1: Stand, Sit  Technique 1: Sit to stand, Stand to sit  Transfer Level of Assistance 1: Close supervision  Trials/Comments 1: Pt performed sit<>stand with close supervision and utilized BUE. pt displayed decreased balance upon standing. Pt displayed decreased eccentric control upon sitting back in bed.    Ambulation/Gait Training  Ambulation/Gait Training Performed: Yes  Ambulation/Gait Training 1  Surface 1: Level tile  Device 1: No device  Gait Support Devices: Other (Comment) (heel WB post-op shoe)  Assistance 1: Close supervision  Quality of Gait 1: Wide base of support, Decreased step length, Forward flexed posture  Comments/Distance (ft) 1: pt ambulated 10ftx2 with close supervision while wearing heel WB post-op shoe. Pt utilized furniture grabbing to maintain balance with ambulation. Pt had mild LOB while ambulating. Pt declined HHA during ambulation for safety.    Stairs  Stairs: No (pt declined stair negotiation with PT. She stated she was confident she could complete them independently upon discharge.)  Extremity/Trunk Assessments:  RLE   RLE :  Exceptions to WFL (pt unable to WB through full foot due to postop precautions)  LLE   LLE : Exceptions to WFL (pt unable to WB through full foot due to postop precautions)  Treatments:    Bed Mobility  Bed Mobility: Yes  Bed Mobility 1  Bed Mobility 1: Supine to sitting, Sitting to supine  Level of Assistance 1: Modified independent  Bed Mobility Comments 1: pt performed supine<>sit modified independent while utilizing bedrails for forward trunk lean and scooting to EOB.    Ambulation/Gait Training  Ambulation/Gait Training Performed: Yes  Ambulation/Gait Training 1  Surface 1: Level tile  Device 1: No device  Gait Support Devices: Other (Comment) (heel WB post-op shoe)  Assistance 1: Close supervision  Quality of Gait 1: Wide base of support, Decreased step length, Forward flexed posture  Comments/Distance (ft) 1: pt ambulated 10ftx2 with close supervision while wearing heel WB post-op shoe. Pt utilized furniture grabbing to maintain balance with ambulation. Pt had mild LOB while ambulating. Pt declined HHA during ambulation for safety.  Transfers  Transfer: Yes  Transfer 1  Transfer From 1: Sit to, Stand to  Transfer to 1: Stand, Sit  Technique 1: Sit to stand, Stand to sit  Transfer Level of Assistance 1: Close supervision  Trials/Comments 1: Pt performed sit<>stand with close supervision and utilized BUE. pt displayed decreased balance upon standing. Pt displayed decreased eccentric control upon sitting back in bed.    Stairs  Stairs: No (pt declined stair negotiation with PT. She stated she was confident she could complete them independently upon discharge.)    Outcome Measures:  [facility] Basic Mobility  Turning from your back to your side while in a flat bed without using bedrails: None  Moving from lying on your back to sitting on the side of a flat bed without using bedrails: None  Moving to and from bed to chair (including a wheelchair): A little  Standing up from a chair using your arms (e.g. wheelchair or  bedside chair): A little  To walk in hospital room: A little  Climbing 3-5 steps with railing: A little  Basic Mobility - Total Score: 20    Encounter Problems       Encounter Problems (Active)       PT Problem       ambulation (Progressing)       Start:  03/20/24    Expected End:  04/19/24       Pt will demonstrate ability to ambulate 100ft with heel WB postop shoe modified independent for safety upon discharge.            PT Problem       Stairs (Not Progressing)       Start:  03/20/24    Expected End:  04/19/24       Pt will demonstrate ability to ascend/descend 1 flight of stairs with 1 handrail and modified independent for functional mobility at home upon discharge.                Education Documentation  Precautions, taught by RICHA Stewart at 3/20/2024  9:16 AM.  Learner: Patient  Readiness: Acceptance  Method: Explanation, Demonstration  Response: Verbalizes Understanding, Demonstrated Understanding    Mobility Training, taught by RICHA Stewart at 3/20/2024  9:16 AM.  Learner: Patient  Readiness: Acceptance  Method: Explanation, Demonstration  Response: Verbalizes Understanding, Demonstrated Understanding    Education Comments  No comments found.    RICHA STEWART

## 2024-03-21 LAB
BACTERIA SPEC CULT: ABNORMAL
GRAM STN SPEC: ABNORMAL
GRAM STN SPEC: ABNORMAL

## 2024-03-28 ENCOUNTER — OFFICE VISIT (OUTPATIENT)
Dept: WOUND CARE | Facility: HOSPITAL | Age: 64
End: 2024-03-28
Payer: COMMERCIAL

## 2024-03-28 PROCEDURE — 99213 OFFICE O/P EST LOW 20 MIN: CPT

## 2024-04-04 ENCOUNTER — OFFICE VISIT (OUTPATIENT)
Dept: WOUND CARE | Facility: HOSPITAL | Age: 64
End: 2024-04-04
Payer: COMMERCIAL

## 2024-04-04 PROCEDURE — 11042 DBRDMT SUBQ TIS 1ST 20SQCM/<: CPT

## 2024-04-11 ENCOUNTER — OFFICE VISIT (OUTPATIENT)
Dept: WOUND CARE | Facility: HOSPITAL | Age: 64
End: 2024-04-11
Payer: COMMERCIAL

## 2024-04-11 PROCEDURE — 11042 DBRDMT SUBQ TIS 1ST 20SQCM/<: CPT

## 2024-04-18 ENCOUNTER — OFFICE VISIT (OUTPATIENT)
Dept: WOUND CARE | Facility: HOSPITAL | Age: 64
End: 2024-04-18
Payer: COMMERCIAL

## 2024-04-18 PROCEDURE — 11042 DBRDMT SUBQ TIS 1ST 20SQCM/<: CPT

## 2024-04-25 ENCOUNTER — OFFICE VISIT (OUTPATIENT)
Dept: WOUND CARE | Facility: HOSPITAL | Age: 64
End: 2024-04-25
Payer: COMMERCIAL

## 2024-04-25 DIAGNOSIS — L89.893 PRESSURE INJURY OF RIGHT FOOT, STAGE 3 (MULTI): Primary | ICD-10-CM

## 2024-04-25 PROCEDURE — 11042 DBRDMT SUBQ TIS 1ST 20SQCM/<: CPT

## 2024-04-25 PROCEDURE — 87070 CULTURE OTHR SPECIMN AEROBIC: CPT | Mod: WESLAB

## 2024-04-28 LAB
BACTERIA SPEC CULT: ABNORMAL
GRAM STN SPEC: ABNORMAL
GRAM STN SPEC: ABNORMAL

## 2024-05-02 ENCOUNTER — OFFICE VISIT (OUTPATIENT)
Dept: WOUND CARE | Facility: HOSPITAL | Age: 64
End: 2024-05-02
Payer: COMMERCIAL

## 2024-05-02 PROCEDURE — 11042 DBRDMT SUBQ TIS 1ST 20SQCM/<: CPT

## 2024-05-02 PROCEDURE — 97597 DBRDMT OPN WND 1ST 20 CM/<: CPT

## 2024-05-09 ENCOUNTER — OFFICE VISIT (OUTPATIENT)
Dept: WOUND CARE | Facility: HOSPITAL | Age: 64
End: 2024-05-09
Payer: COMMERCIAL

## 2024-05-09 PROCEDURE — 11042 DBRDMT SUBQ TIS 1ST 20SQCM/<: CPT

## 2024-05-16 ENCOUNTER — HOSPITAL ENCOUNTER (OUTPATIENT)
Dept: RADIOLOGY | Facility: HOSPITAL | Age: 64
Discharge: HOME | End: 2024-05-16
Payer: COMMERCIAL

## 2024-05-16 ENCOUNTER — OFFICE VISIT (OUTPATIENT)
Dept: WOUND CARE | Facility: HOSPITAL | Age: 64
End: 2024-05-16
Payer: COMMERCIAL

## 2024-05-16 DIAGNOSIS — M77.8 ENTHESOPATHY OF FOOT: Primary | ICD-10-CM

## 2024-05-16 DIAGNOSIS — M77.8 ENTHESOPATHY OF FOOT: ICD-10-CM

## 2024-05-16 PROCEDURE — 73630 X-RAY EXAM OF FOOT: CPT | Mod: RT

## 2024-05-16 PROCEDURE — 73630 X-RAY EXAM OF FOOT: CPT | Mod: LT

## 2024-05-16 PROCEDURE — 73630 X-RAY EXAM OF FOOT: CPT | Mod: RIGHT SIDE | Performed by: RADIOLOGY

## 2024-05-16 PROCEDURE — 73630 X-RAY EXAM OF FOOT: CPT | Mod: LEFT SIDE | Performed by: RADIOLOGY

## 2024-05-16 PROCEDURE — 11042 DBRDMT SUBQ TIS 1ST 20SQCM/<: CPT

## 2024-05-23 ENCOUNTER — OFFICE VISIT (OUTPATIENT)
Dept: WOUND CARE | Facility: HOSPITAL | Age: 64
End: 2024-05-23
Payer: COMMERCIAL

## 2024-05-23 PROCEDURE — 11042 DBRDMT SUBQ TIS 1ST 20SQCM/<: CPT

## 2024-05-30 ENCOUNTER — APPOINTMENT (OUTPATIENT)
Dept: WOUND CARE | Facility: HOSPITAL | Age: 64
End: 2024-05-30
Payer: COMMERCIAL

## 2024-06-06 ENCOUNTER — OFFICE VISIT (OUTPATIENT)
Dept: WOUND CARE | Facility: HOSPITAL | Age: 64
End: 2024-06-06
Payer: COMMERCIAL

## 2024-06-06 PROCEDURE — 11042 DBRDMT SUBQ TIS 1ST 20SQCM/<: CPT

## 2024-06-13 ENCOUNTER — OFFICE VISIT (OUTPATIENT)
Dept: WOUND CARE | Facility: HOSPITAL | Age: 64
End: 2024-06-13
Payer: COMMERCIAL

## 2024-06-13 PROCEDURE — 11042 DBRDMT SUBQ TIS 1ST 20SQCM/<: CPT

## 2024-06-20 ENCOUNTER — OFFICE VISIT (OUTPATIENT)
Dept: WOUND CARE | Facility: HOSPITAL | Age: 64
End: 2024-06-20
Payer: COMMERCIAL

## 2024-06-20 PROCEDURE — 11042 DBRDMT SUBQ TIS 1ST 20SQCM/<: CPT

## 2024-06-27 ENCOUNTER — OFFICE VISIT (OUTPATIENT)
Dept: WOUND CARE | Facility: HOSPITAL | Age: 64
End: 2024-06-27
Payer: COMMERCIAL

## 2024-06-27 PROCEDURE — 11042 DBRDMT SUBQ TIS 1ST 20SQCM/<: CPT

## 2024-07-05 ENCOUNTER — PRE-ADMISSION TESTING (OUTPATIENT)
Dept: PREADMISSION TESTING | Facility: HOSPITAL | Age: 64
End: 2024-07-05
Payer: COMMERCIAL

## 2024-07-05 ENCOUNTER — LAB (OUTPATIENT)
Dept: LAB | Facility: LAB | Age: 64
End: 2024-07-05
Payer: COMMERCIAL

## 2024-07-05 VITALS
OXYGEN SATURATION: 98 % | SYSTOLIC BLOOD PRESSURE: 175 MMHG | WEIGHT: 181.3 LBS | RESPIRATION RATE: 18 BRPM | BODY MASS INDEX: 29.14 KG/M2 | TEMPERATURE: 96.8 F | HEIGHT: 66 IN | DIASTOLIC BLOOD PRESSURE: 88 MMHG | HEART RATE: 59 BPM

## 2024-07-05 DIAGNOSIS — Z01.818 PREOP TESTING: Primary | ICD-10-CM

## 2024-07-05 DIAGNOSIS — Z01.818 PREOP TESTING: ICD-10-CM

## 2024-07-05 LAB
ANION GAP SERPL CALC-SCNC: 11 MMOL/L
BASOPHILS # BLD AUTO: 0.05 X10*3/UL (ref 0–0.1)
BASOPHILS NFR BLD AUTO: 1 %
BUN SERPL-MCNC: 17 MG/DL (ref 8–25)
CALCIUM SERPL-MCNC: 9.4 MG/DL (ref 8.5–10.4)
CHLORIDE SERPL-SCNC: 100 MMOL/L (ref 97–107)
CO2 SERPL-SCNC: 27 MMOL/L (ref 24–31)
CREAT SERPL-MCNC: 0.5 MG/DL (ref 0.4–1.6)
EGFRCR SERPLBLD CKD-EPI 2021: >90 ML/MIN/1.73M*2
EOSINOPHIL # BLD AUTO: 0.14 X10*3/UL (ref 0–0.7)
EOSINOPHIL NFR BLD AUTO: 2.7 %
ERYTHROCYTE [DISTWIDTH] IN BLOOD BY AUTOMATED COUNT: 13.3 % (ref 11.5–14.5)
GLUCOSE SERPL-MCNC: 97 MG/DL (ref 65–99)
HCT VFR BLD AUTO: 41.7 % (ref 36–46)
HGB BLD-MCNC: 13.7 G/DL (ref 12–16)
IMM GRANULOCYTES # BLD AUTO: 0.02 X10*3/UL (ref 0–0.7)
IMM GRANULOCYTES NFR BLD AUTO: 0.4 % (ref 0–0.9)
LYMPHOCYTES # BLD AUTO: 1.65 X10*3/UL (ref 1.2–4.8)
LYMPHOCYTES NFR BLD AUTO: 32 %
MCH RBC QN AUTO: 29.8 PG (ref 26–34)
MCHC RBC AUTO-ENTMCNC: 32.9 G/DL (ref 32–36)
MCV RBC AUTO: 91 FL (ref 80–100)
MONOCYTES # BLD AUTO: 0.53 X10*3/UL (ref 0.1–1)
MONOCYTES NFR BLD AUTO: 10.3 %
NEUTROPHILS # BLD AUTO: 2.76 X10*3/UL (ref 1.2–7.7)
NEUTROPHILS NFR BLD AUTO: 53.6 %
NRBC BLD-RTO: 0 /100 WBCS (ref 0–0)
PLATELET # BLD AUTO: 242 X10*3/UL (ref 150–450)
POTASSIUM SERPL-SCNC: 3.9 MMOL/L (ref 3.4–5.1)
RBC # BLD AUTO: 4.59 X10*6/UL (ref 4–5.2)
SODIUM SERPL-SCNC: 138 MMOL/L (ref 133–145)
WBC # BLD AUTO: 5.2 X10*3/UL (ref 4.4–11.3)

## 2024-07-05 PROCEDURE — 36415 COLL VENOUS BLD VENIPUNCTURE: CPT

## 2024-07-05 PROCEDURE — 93005 ELECTROCARDIOGRAM TRACING: CPT

## 2024-07-05 PROCEDURE — 80048 BASIC METABOLIC PNL TOTAL CA: CPT

## 2024-07-05 PROCEDURE — 93010 ELECTROCARDIOGRAM REPORT: CPT | Performed by: INTERNAL MEDICINE

## 2024-07-05 PROCEDURE — 85025 COMPLETE CBC W/AUTO DIFF WBC: CPT

## 2024-07-05 ASSESSMENT — DUKE ACTIVITY SCORE INDEX (DASI)
CAN YOU DO HEAVY WORK AROUND THE HOUSE LIKE SCRUBBING FLOORS OR LIFTING AND MOVING HEAVY FURNITURE: NO
CAN YOU WALK A BLOCK OR TWO ON LEVEL GROUND: YES
CAN YOU PARTICIPATE IN MODERATE RECREATIONAL ACTIVITIES LIKE GOLF, BOWLING, DANCING, DOUBLES TENNIS OR THROWING A BASEBALL OR FOOTBALL: YES
CAN YOU HAVE SEXUAL RELATIONS: YES
CAN YOU DO YARD WORK LIKE RAKING LEAVES, WEEDING OR PUSHING A MOWER: YES
CAN YOU RUN A SHORT DISTANCE: NO
TOTAL_SCORE: 34.7
CAN YOU TAKE CARE OF YOURSELF (EAT, DRESS, BATHE, OR USE TOILET): YES
DASI METS SCORE: 7
CAN YOU DO LIGHT WORK AROUND THE HOUSE LIKE DUSTING OR WASHING DISHES: YES
CAN YOU DO MODERATE WORK AROUND THE HOUSE LIKE VACUUMING, SWEEPING FLOORS OR CARRYING GROCERIES: YES
CAN YOU WALK INDOORS, SUCH AS AROUND YOUR HOUSE: YES
CAN YOU CLIMB A FLIGHT OF STAIRS OR WALK UP A HILL: YES
CAN YOU PARTICIPATE IN STRENOUS SPORTS LIKE SWIMMING, SINGLES TENNIS, FOOTBALL, BASKETBALL, OR SKIING: NO

## 2024-07-05 ASSESSMENT — ENCOUNTER SYMPTOMS
EYES NEGATIVE: 1
HEMATOLOGIC/LYMPHATIC NEGATIVE: 1
CARDIOVASCULAR NEGATIVE: 1
PSYCHIATRIC NEGATIVE: 1
ALLERGIC/IMMUNOLOGIC NEGATIVE: 1
ENDOCRINE NEGATIVE: 1
GASTROINTESTINAL NEGATIVE: 1
RESPIRATORY NEGATIVE: 1
CONSTITUTIONAL NEGATIVE: 1
NEUROLOGICAL NEGATIVE: 1

## 2024-07-05 ASSESSMENT — PAIN SCALES - GENERAL: PAINLEVEL_OUTOF10: 5 - MODERATE PAIN

## 2024-07-05 ASSESSMENT — PAIN DESCRIPTION - DESCRIPTORS: DESCRIPTORS: ACHING

## 2024-07-05 ASSESSMENT — PAIN - FUNCTIONAL ASSESSMENT: PAIN_FUNCTIONAL_ASSESSMENT: 0-10

## 2024-07-05 NOTE — PREPROCEDURE INSTRUCTIONS
Medication List            Accurate as of July 5, 2024  4:07 PM. Always use your most recent med list.                acetaminophen 650 mg ER tablet  Commonly known as: Tylenol 8 HOUR  Medication Adjustments for Surgery: Other (Comment)  Notes to patient: CAN TAKE THE MORNING OF SURGERY IF NEEDED     ascorbic acid 1,000 mg tablet  Commonly known as: Vitamin C  Medication Adjustments for Surgery: Stop 7 days before surgery     aspirin 81 mg EC tablet  Medication Adjustments for Surgery: Stop 7 days before surgery     biotin 5 mg capsule  Medication Adjustments for Surgery: Stop 7 days before surgery     coenzyme Q10 100 mg tablet  Medication Adjustments for Surgery: Stop 7 days before surgery     COLLAGEN SKIN RENEWAL ORAL  Medication Adjustments for Surgery: Stop 7 days before surgery     cyanocobalamin 500 mcg tablet  Commonly known as: Vitamin B-12  Medication Adjustments for Surgery: Stop 7 days before surgery     GLUCOSAMINE-CHONDROITIN ORAL  Medication Adjustments for Surgery: Stop 7 days before surgery     multivitamin with minerals iron-free  Commonly known as: Centrum Silver  Medication Adjustments for Surgery: Stop 7 days before surgery                  Preoperative Fasting Guidelines    Why must I stop eating and drinking near surgery time?  With sedation, food or liquid in your stomach can enter your lungs causing serious complications  Increases nausea and vomiting    When do I need to stop eating and drinking before my surgery?  Do not eat any food after midnight the night before your surgery/procedure.  You may have up to 13.5 ounces of clear liquid until TWO hours before your instructed arrival time to the hospital.  This includes water, black tea/coffee, (no milk or cream) apple juice, and electrolyte drinks (Gatorade)  You may chew gum until TWO hours before your surgery/procedure    PAT DISCHARGE INSTRUCTIONS    Please call the Same Day Surgery (SDS) Department of the hospital where your  procedure will be performed after 2:00 PM the day before your surgery. If you are scheduled on a Monday, or a Tuesday following a Monday holiday, you will need to call on the last business day prior to your surgery.    [facility]  [address] [address], [address]  [phone]  [facility]  [address] [place] [address] [phone]  [facility]  [address]  [place] [id number]  [phone]    Please let your surgeon know if:      You develop any open sores, shingles, burning or painful urination as these may increase your risk of an infection.   You no longer wish to have the surgery.   Any other personal circumstances change that may lead to the need to cancel or defer this surgery-such as being sick or getting admitted to any hospital within one week of your planned procedure.    Your contact details change, such as a change of address or phone number.    Starting now:     Please DO NOT drink alcohol or smoke for 24 hours before surgery. It is well known that quitting smoking can make a huge difference to your health and recovery from surgery. The longer you abstain from smoking, the better your chances of a healthy recovery. If you need help with quitting, call 2-800-QUIT-NOW to be connected to a trained counselor who will discuss the best methods to help you quit.     Before your surgery:    Please stop all supplements 7 days prior to surgery. Or as directed by your surgeon.   Please stop taking NSAID pain medicine such as Advil and Motrin 7 days before surgery.    If you develop any fever, cough, cold, rashes, cuts, scratches, scrapes, urinary symptoms or infection anywhere on your body (including teeth and gums) prior to surgery, please call your surgeon’s office as soon as possible. This may require treatment to reduce the chance of cancellation on  the day of surgery.    The day before your surgery:   DIET- Please follow the diet instructions at the top of your packet.   Get a good night’s rest.  Use the special soap for bathing if you have been instructed to use one.    Scheduled surgery times may change and you will be notified if this occurs - please check your personal voicemail for any updates.     On the morning of surgery:   Wear comfortable, loose fitting clothes which open in the front. Please do not wear moisturizers, creams, lotions, makeup or perfume.    Please bring with you to surgery:   Photo ID and insurance card   Current list of medicines and allergies   Pacemaker/ Defibrillator/Heart stent cards   CPAP machine and mask    Slings/ splints/ crutches   A copy of your complete advanced directive/DHPOA.    Please do NOT bring with you to surgery:   All jewelry and valuables should be left at home.   Prosthetic devices such as contact lenses, hearing aids, dentures, eyelash extensions, hairpins and body piercings must be removed prior to going in to the surgical suite.    After outpatient surgery:   A responsible adult MUST accompany you at the time of discharge and stay with you for 24 hours after your surgery. You may NOT drive yourself home after surgery.    Do not drive, operate machinery, make critical decisions or do activities that require co-ordination or balance until after a night’s sleep.   Do not drink alcoholic beverages for 24 hours.   Instructions for resuming your medications will be provided by your surgeon.    CALL YOUR DOCTOR AFTER SURGERY IF YOU HAVE:     Chills and/or a fever of 101° F or higher.    Redness, swelling, pus or drainage from your surgical wound or a bad smell from the wound.    Lightheadedness, fainting or confusion.    Persistent vomiting (throwing up) and are not able to eat or drink for 12 hours.    Three or more loose, watery bowel movements in 24 hours (diarrhea).   Difficulty or pain while urinating( after  non-urological surgery)    Pain and swelling in your legs, especially if it is only on one side.    Difficulty breathing or are breathing faster than normal.    Any new concerning symptoms.

## 2024-07-05 NOTE — H&P (VIEW-ONLY)
CPM/PAT Evaluation       Name: Regina Flaherty (Regina Flaherty)  /Age: 1960/63 y.o.     In-Person       Chief Complaint: Osteophyte Right Foot    HPI  Patient is a 63-year-old female presenting today for preoperative assessment.  Patient is status post transmetatarsal amputation of her right foot, resulting in subsequent pain and difficulty with ambulation walking with her right foot. Patient also reports the development of uncomfortable calluses to her right foot. She has followed with Dr. Magallon for podiatry at the wound center for some time. Patient denies any recent chest pain, pressure or palpitations.  She denies shortness of breath.  Denies of blood in her urine or stool.  She denies any fever, chills or recent illness.  Patient was evaluated by Dr. Magallon who recommended recommended removal of the bone spurs and recession gastrocnemius of the right foot. This will be completed at [facility] on 2024.     Past Medical History:   Diagnosis Date    Arthritis     Diabetes mellitus (Multi)        Past Surgical History:   Procedure Laterality Date    OTHER SURGICAL HISTORY  2019     section    OTHER SURGICAL HISTORY  2019    Hysterectomy    OTHER SURGICAL HISTORY  2019    Cystocele repair    OTHER SURGICAL HISTORY  2019    Mid-urethral sling insertion    OTHER SURGICAL HISTORY  2019    Abdominal panniculectomy    OTHER SURGICAL HISTORY  2019    Foot surgery    OTHER SURGICAL HISTORY  11/15/2019    Sacrocolpopexy    OTHER SURGICAL HISTORY  11/15/2019    Rectocele repair     Social History     Substance and Sexual Activity   Alcohol Use Not Currently     Tobacco Use: Medium Risk (2024)    Patient History     Smoking Tobacco Use: Former     Smokeless Tobacco Use: Never     Passive Exposure: Not on file       Patient Sexual activity questions deferred to the physician.    No family history on file.    Allergies   Allergen Reactions     Vancomycin Other     Minor Syndrome    Zosyn [Piperacillin-Tazobactam] Anaphylaxis    Bactrim [Sulfamethoxazole-Trimethoprim] Hives and Rash    Doxycycline Rash     Sore throat    Keflex [Cephalexin] Rash       Current Outpatient Medications:     ascorbic acid (Vitamin C) 1,000 mg tablet, Take 2 tablets (2,000 mg) by mouth once daily., Disp: , Rfl:     ascorbic acid/collagen hydr (COLLAGEN SKIN RENEWAL ORAL), Take 2,500 mg by mouth once daily., Disp: , Rfl:     aspirin 81 mg EC tablet, Take 1 tablet (81 mg) by mouth once daily., Disp: , Rfl:     biotin 5 mg capsule, Take 2 capsules (10 mg) by mouth once daily., Disp: , Rfl:     co-enzyme Q-10 50 mg capsule, Take 2 capsules by mouth once daily., Disp: , Rfl:     cyanocobalamin (Vitamin B-12) 500 mcg tablet, Take 1 tablet (500 mcg) by mouth once daily., Disp: , Rfl:     glucosamine HCl/chondroitin snell (GLUCOSAMINE-CHONDROITIN ORAL), Take 2 tablets by mouth once daily. 1500/1200, Disp: , Rfl:     multivitamin with minerals iron-free (Centrum Silver), Take 1 tablet by mouth once daily., Disp: , Rfl:     acetaminophen (Tylenol 8 HOUR) 650 mg ER tablet, Take 1 tablet (650 mg) by mouth every 8 hours if needed for mild pain (1 - 3). Do not crush, chew, or split., Disp: , Rfl:     pantoprazole (ProtoNix) 40 mg EC tablet, Take 1 tablet (40 mg) by mouth once daily. Do not crush, chew, or split. Do not start before March 21, 2024. (Patient not taking: Reported on 7/5/2024), Disp: 90 tablet, Rfl: 0    pyridoxine (Vitamin B-6) 50 mg tablet, Take 1 tablet (50 mg) by mouth once daily. Do not start before March 21, 2024. (Patient not taking: Reported on 7/5/2024), Disp: 90 tablet, Rfl: 0    Review of Systems   Constitutional: Negative.   HENT: Negative.     Eyes: Negative.    Cardiovascular: Negative.    Respiratory: Negative.     Endocrine: Negative.    Hematologic/Lymphatic: Negative.    Musculoskeletal:  Positive for joint pain.        Difficulty with ambulation  "  Gastrointestinal: Negative.    Neurological: Negative.    Psychiatric/Behavioral: Negative.     Allergic/Immunologic: Negative.         Physical Exam  Cardiovascular:      Rate and Rhythm: Normal rate and regular rhythm.      Heart sounds: No murmur heard.     No friction rub. No gallop.   Pulmonary:      Effort: Pulmonary effort is normal.      Breath sounds: No wheezing, rhonchi or rales.   Abdominal:      General: Bowel sounds are normal.      Palpations: Abdomen is soft.   Musculoskeletal:      Right lower leg: No edema.      Left lower leg: No edema.   Skin:     General: Skin is warm and dry.      Capillary Refill: Capillary refill takes less than 2 seconds.   Neurological:      Mental Status: She is alert and oriented to person, place, and time.   Psychiatric:         Mood and Affect: Mood normal.         Behavior: Behavior normal.          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 Magallon on 2024 at [facility]  History of Diabetes: Patient no longer on medications for this after significant weight loss, hemoglobin A1c completed in March of this year was 5.8.  History of right foot abscess and wound infection: Patient underwent incision and drainage in March of this year for a right foot abscess, patient was treated with antibiotics.  Gastroesophageal Reflux Disease: Well managed on pantoprazole    EKG completed in PAT pending interpretation  CBC and BMP completed in PAT      24 at 4:38 PM - BRYAN Wilburn-CNP          "

## 2024-07-05 NOTE — CPM/PAT H&P
CPM/PAT Evaluation       Name: Regina Flaherty (Regina Flaherty)  /Age: 1960/63 y.o.     In-Person       Chief Complaint: Osteophyte Right Foot    HPI  Patient is a 63-year-old female presenting today for preoperative assessment.  Patient is status post transmetatarsal amputation of her right foot, resulting in subsequent pain and difficulty with ambulation walking with her right foot. Patient also reports the development of uncomfortable calluses to her right foot. She has followed with Dr. Magallon for podiatry at the wound center for some time. Patient denies any recent chest pain, pressure or palpitations.  She denies shortness of breath.  Denies of blood in her urine or stool.  She denies any fever, chills or recent illness.  Patient was evaluated by Dr. Magallon who recommended recommended removal of the bone spurs and recession gastrocnemius of the right foot. This will be completed at [facility] on 2024.     Past Medical History:   Diagnosis Date    Arthritis     Diabetes mellitus (Multi)        Past Surgical History:   Procedure Laterality Date    OTHER SURGICAL HISTORY  2019     section    OTHER SURGICAL HISTORY  2019    Hysterectomy    OTHER SURGICAL HISTORY  2019    Cystocele repair    OTHER SURGICAL HISTORY  2019    Mid-urethral sling insertion    OTHER SURGICAL HISTORY  2019    Abdominal panniculectomy    OTHER SURGICAL HISTORY  2019    Foot surgery    OTHER SURGICAL HISTORY  11/15/2019    Sacrocolpopexy    OTHER SURGICAL HISTORY  11/15/2019    Rectocele repair     Social History     Substance and Sexual Activity   Alcohol Use Not Currently     Tobacco Use: Medium Risk (2024)    Patient History     Smoking Tobacco Use: Former     Smokeless Tobacco Use: Never     Passive Exposure: Not on file       Patient Sexual activity questions deferred to the physician.    No family history on file.    Allergies   Allergen Reactions     Vancomycin Other     Minor Syndrome    Zosyn [Piperacillin-Tazobactam] Anaphylaxis    Bactrim [Sulfamethoxazole-Trimethoprim] Hives and Rash    Doxycycline Rash     Sore throat    Keflex [Cephalexin] Rash       Current Outpatient Medications:     ascorbic acid (Vitamin C) 1,000 mg tablet, Take 2 tablets (2,000 mg) by mouth once daily., Disp: , Rfl:     ascorbic acid/collagen hydr (COLLAGEN SKIN RENEWAL ORAL), Take 2,500 mg by mouth once daily., Disp: , Rfl:     aspirin 81 mg EC tablet, Take 1 tablet (81 mg) by mouth once daily., Disp: , Rfl:     biotin 5 mg capsule, Take 2 capsules (10 mg) by mouth once daily., Disp: , Rfl:     co-enzyme Q-10 50 mg capsule, Take 2 capsules by mouth once daily., Disp: , Rfl:     cyanocobalamin (Vitamin B-12) 500 mcg tablet, Take 1 tablet (500 mcg) by mouth once daily., Disp: , Rfl:     glucosamine HCl/chondroitin snell (GLUCOSAMINE-CHONDROITIN ORAL), Take 2 tablets by mouth once daily. 1500/1200, Disp: , Rfl:     multivitamin with minerals iron-free (Centrum Silver), Take 1 tablet by mouth once daily., Disp: , Rfl:     acetaminophen (Tylenol 8 HOUR) 650 mg ER tablet, Take 1 tablet (650 mg) by mouth every 8 hours if needed for mild pain (1 - 3). Do not crush, chew, or split., Disp: , Rfl:     pantoprazole (ProtoNix) 40 mg EC tablet, Take 1 tablet (40 mg) by mouth once daily. Do not crush, chew, or split. Do not start before March 21, 2024. (Patient not taking: Reported on 7/5/2024), Disp: 90 tablet, Rfl: 0    pyridoxine (Vitamin B-6) 50 mg tablet, Take 1 tablet (50 mg) by mouth once daily. Do not start before March 21, 2024. (Patient not taking: Reported on 7/5/2024), Disp: 90 tablet, Rfl: 0    Review of Systems   Constitutional: Negative.   HENT: Negative.     Eyes: Negative.    Cardiovascular: Negative.    Respiratory: Negative.     Endocrine: Negative.    Hematologic/Lymphatic: Negative.    Musculoskeletal:  Positive for joint pain.        Difficulty with ambulation  "  Gastrointestinal: Negative.    Neurological: Negative.    Psychiatric/Behavioral: Negative.     Allergic/Immunologic: Negative.         Physical Exam  Cardiovascular:      Rate and Rhythm: Normal rate and regular rhythm.      Heart sounds: No murmur heard.     No friction rub. No gallop.   Pulmonary:      Effort: Pulmonary effort is normal.      Breath sounds: No wheezing, rhonchi or rales.   Abdominal:      General: Bowel sounds are normal.      Palpations: Abdomen is soft.   Musculoskeletal:      Right lower leg: No edema.      Left lower leg: No edema.   Skin:     General: Skin is warm and dry.      Capillary Refill: Capillary refill takes less than 2 seconds.   Neurological:      Mental Status: She is alert and oriented to person, place, and time.   Psychiatric:         Mood and Affect: Mood normal.         Behavior: Behavior normal.          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 Magallon on 2024 at [facility]  History of Diabetes: Patient no longer on medications for this after significant weight loss, hemoglobin A1c completed in March of this year was 5.8.  History of right foot abscess and wound infection: Patient underwent incision and drainage in March of this year for a right foot abscess, patient was treated with antibiotics.  Gastroesophageal Reflux Disease: Well managed on pantoprazole    EKG completed in PAT pending interpretation  CBC and BMP completed in PAT      24 at 4:38 PM - BRYAN Wilburn-CNP          "

## 2024-07-08 LAB
ATRIAL RATE: 60 BPM
P AXIS: 34 DEGREES
P OFFSET: 162 MS
P ONSET: 102 MS
PR INTERVAL: 222 MS
Q ONSET: 213 MS
QRS COUNT: 10 BEATS
QRS DURATION: 110 MS
QT INTERVAL: 412 MS
QTC CALCULATION(BAZETT): 412 MS
QTC FREDERICIA: 412 MS
R AXIS: -52 DEGREES
T AXIS: 29 DEGREES
T OFFSET: 419 MS
VENTRICULAR RATE: 60 BPM

## 2024-07-10 ENCOUNTER — OFFICE VISIT (OUTPATIENT)
Dept: CARDIOLOGY | Facility: CLINIC | Age: 64
End: 2024-07-10
Payer: COMMERCIAL

## 2024-07-10 VITALS
DIASTOLIC BLOOD PRESSURE: 99 MMHG | BODY MASS INDEX: 29.05 KG/M2 | HEART RATE: 76 BPM | OXYGEN SATURATION: 96 % | SYSTOLIC BLOOD PRESSURE: 158 MMHG | WEIGHT: 180 LBS

## 2024-07-10 DIAGNOSIS — R94.31 ABNORMAL ECG: Primary | ICD-10-CM

## 2024-07-10 DIAGNOSIS — Z01.810 PREOPERATIVE CARDIOVASCULAR EXAMINATION: ICD-10-CM

## 2024-07-10 PROCEDURE — 99214 OFFICE O/P EST MOD 30 MIN: CPT | Performed by: INTERNAL MEDICINE

## 2024-07-10 PROCEDURE — 99204 OFFICE O/P NEW MOD 45 MIN: CPT | Performed by: INTERNAL MEDICINE

## 2024-07-10 PROCEDURE — 1036F TOBACCO NON-USER: CPT | Performed by: INTERNAL MEDICINE

## 2024-07-10 ASSESSMENT — PATIENT HEALTH QUESTIONNAIRE - PHQ9: 1. LITTLE INTEREST OR PLEASURE IN DOING THINGS: NOT AT ALL

## 2024-07-10 ASSESSMENT — ENCOUNTER SYMPTOMS
DEPRESSION: 0
OCCASIONAL FEELINGS OF UNSTEADINESS: 0
LOSS OF SENSATION IN FEET: 0

## 2024-07-10 ASSESSMENT — PAIN SCALES - GENERAL: PAINLEVEL: 0-NO PAIN

## 2024-07-10 NOTE — PROGRESS NOTES
Subjective      Chief Complaint   Patient presents with    <9>s/c bone spurs/ pat stated abn ekg        Referred to our office for preoperative cardiovascular risk assessment.  She has a history of chronic foot bone infections with resection of her 10 toes with transverse metatarsal amputations because of these recurrent infections/osteomyelitis that will not resolve with antibiotic therapy.  She now has bone spurs in both feet that required intervention.    From a cardiac standpoint she has no history of previous myocardial infarction, heart failure, valvular heart disease, syncope or sustained arrhythmias.        EKG showed sinus rhythm at a heart rate of 60 bpm, leftward axis deviation and what was interpreted as anteroseptal myocardial infarction but I would suggest that this may be just slow R wave progression which would be a normal variant.  There is no acute ischemia, there was evidence of a first-degree AV block, and QTc of 412ms         Review of Systems   All other systems reviewed and are negative.       Objective   Physical Exam  Constitutional:       Appearance: Normal appearance.   HENT:      Head: Normocephalic and atraumatic.   Eyes:      Pupils: Pupils are equal, round, and reactive to light.   Cardiovascular:      Rate and Rhythm: Normal rate and regular rhythm.      Pulses: Normal pulses.      Heart sounds: Normal heart sounds.   Pulmonary:      Effort: Pulmonary effort is normal.      Breath sounds: Normal breath sounds.   Abdominal:      General: Abdomen is flat. Bowel sounds are normal.      Palpations: Abdomen is soft.   Musculoskeletal:         General: Normal range of motion.      Cervical back: Normal range of motion.   Skin:     General: Skin is warm and dry.   Neurological:      General: No focal deficit present.   Psychiatric:         Mood and Affect: Mood normal.         Judgment: Judgment normal.          Lab Review:   Not applicable    Preoperative cardiovascular examination  She can  proceed with her surgery with a low cardiovascular risk in the absence of known cardiac history, no angina or heart failure symptoms and no unstable ventricular arrhythmias.  Her EKG abnormalities probably represent slow R wave progression is a normal variant to her precordial EKG leads.     I reviewed an EKG from 6/11/2012 that showed SVT at a heart rate of 159 bpm at that time but she also had slow R wave progression similar to her EKG today which would suggest absence of myocardial infarction..     I am going to suggest echocardiography because she is going to undergo surgery on 1 foot this coming Friday and then have her additional foot surgery in 6 weeks and she will follow-up with me before her second surgery to review the results of her echocardiogram.  The other option is for her is reschedule her surgery and have her echo done prior to surgery but she does not want to do this and I think all things considered is unnecessary for hyperlipidemia

## 2024-07-10 NOTE — ASSESSMENT & PLAN NOTE
She can proceed with her surgery with a low cardiovascular risk in the absence of known cardiac history, no angina or heart failure symptoms and no unstable ventricular arrhythmias.  Her EKG abnormalities probably represent slow R wave progression is a normal variant to her precordial EKG leads.     I reviewed an EKG from 6/11/2012 that showed SVT at a heart rate of 159 bpm at that time but she also had slow R wave progression similar to her EKG today which would suggest absence of myocardial infarction..     I am going to suggest echocardiography because she is going to undergo surgery on 1 foot this coming Friday and then have her additional foot surgery in 6 weeks and she will follow-up with me before her second surgery to review the results of her echocardiogram.  The other option is for her is reschedule her surgery and have her echo done prior to surgery but she does not want to do this and I think all things considered is unnecessary for hyperlipidemia

## 2024-07-12 ENCOUNTER — APPOINTMENT (OUTPATIENT)
Dept: RADIOLOGY | Facility: HOSPITAL | Age: 64
End: 2024-07-12
Payer: COMMERCIAL

## 2024-07-12 ENCOUNTER — ANESTHESIA (OUTPATIENT)
Dept: OPERATING ROOM | Facility: HOSPITAL | Age: 64
End: 2024-07-12
Payer: COMMERCIAL

## 2024-07-12 ENCOUNTER — HOSPITAL ENCOUNTER (OUTPATIENT)
Facility: HOSPITAL | Age: 64
Setting detail: OUTPATIENT SURGERY
Discharge: HOME | End: 2024-07-12
Attending: PODIATRIST | Admitting: PODIATRIST
Payer: COMMERCIAL

## 2024-07-12 ENCOUNTER — ANESTHESIA EVENT (OUTPATIENT)
Dept: OPERATING ROOM | Facility: HOSPITAL | Age: 64
End: 2024-07-12
Payer: COMMERCIAL

## 2024-07-12 VITALS
HEIGHT: 66 IN | WEIGHT: 179.9 LBS | BODY MASS INDEX: 28.91 KG/M2 | SYSTOLIC BLOOD PRESSURE: 174 MMHG | TEMPERATURE: 96.8 F | HEART RATE: 56 BPM | DIASTOLIC BLOOD PRESSURE: 82 MMHG | OXYGEN SATURATION: 100 % | RESPIRATION RATE: 18 BRPM

## 2024-07-12 DIAGNOSIS — G89.18 POST-OPERATIVE PAIN: Primary | ICD-10-CM

## 2024-07-12 PROCEDURE — 2500000004 HC RX 250 GENERAL PHARMACY W/ HCPCS (ALT 636 FOR OP/ED): Performed by: ANESTHESIOLOGY

## 2024-07-12 PROCEDURE — 2500000004 HC RX 250 GENERAL PHARMACY W/ HCPCS (ALT 636 FOR OP/ED): Performed by: NURSE ANESTHETIST, CERTIFIED REGISTERED

## 2024-07-12 PROCEDURE — 3700000002 HC GENERAL ANESTHESIA TIME - EACH INCREMENTAL 1 MINUTE: Performed by: PODIATRIST

## 2024-07-12 PROCEDURE — 7100000001 HC RECOVERY ROOM TIME - INITIAL BASE CHARGE: Performed by: PODIATRIST

## 2024-07-12 PROCEDURE — 76000 FLUOROSCOPY <1 HR PHYS/QHP: CPT

## 2024-07-12 PROCEDURE — 2500000005 HC RX 250 GENERAL PHARMACY W/O HCPCS: Performed by: NURSE ANESTHETIST, CERTIFIED REGISTERED

## 2024-07-12 PROCEDURE — 3600000008 HC OR TIME - EACH INCREMENTAL 1 MINUTE - PROCEDURE LEVEL THREE: Performed by: PODIATRIST

## 2024-07-12 PROCEDURE — 7100000010 HC PHASE TWO TIME - EACH INCREMENTAL 1 MINUTE: Performed by: PODIATRIST

## 2024-07-12 PROCEDURE — 2500000005 HC RX 250 GENERAL PHARMACY W/O HCPCS: Performed by: PODIATRIST

## 2024-07-12 PROCEDURE — 7100000002 HC RECOVERY ROOM TIME - EACH INCREMENTAL 1 MINUTE: Performed by: PODIATRIST

## 2024-07-12 PROCEDURE — 2500000004 HC RX 250 GENERAL PHARMACY W/ HCPCS (ALT 636 FOR OP/ED): Mod: JZ

## 2024-07-12 PROCEDURE — 2500000004 HC RX 250 GENERAL PHARMACY W/ HCPCS (ALT 636 FOR OP/ED): Performed by: PODIATRIST

## 2024-07-12 PROCEDURE — 2720000007 HC OR 272 NO HCPCS: Performed by: PODIATRIST

## 2024-07-12 PROCEDURE — 7100000009 HC PHASE TWO TIME - INITIAL BASE CHARGE: Performed by: PODIATRIST

## 2024-07-12 PROCEDURE — 3600000003 HC OR TIME - INITIAL BASE CHARGE - PROCEDURE LEVEL THREE: Performed by: PODIATRIST

## 2024-07-12 PROCEDURE — 3700000001 HC GENERAL ANESTHESIA TIME - INITIAL BASE CHARGE: Performed by: PODIATRIST

## 2024-07-12 RX ORDER — CLINDAMYCIN PHOSPHATE 900 MG/50ML
900 INJECTION, SOLUTION INTRAVENOUS ONCE
Status: COMPLETED | OUTPATIENT
Start: 2024-07-12 | End: 2024-07-12

## 2024-07-12 RX ORDER — SODIUM CHLORIDE, SODIUM LACTATE, POTASSIUM CHLORIDE, CALCIUM CHLORIDE 600; 310; 30; 20 MG/100ML; MG/100ML; MG/100ML; MG/100ML
50 INJECTION, SOLUTION INTRAVENOUS CONTINUOUS
Status: DISCONTINUED | OUTPATIENT
Start: 2024-07-12 | End: 2024-07-12 | Stop reason: HOSPADM

## 2024-07-12 RX ORDER — LIDOCAINE HYDROCHLORIDE 10 MG/ML
INJECTION INFILTRATION; PERINEURAL AS NEEDED
Status: DISCONTINUED | OUTPATIENT
Start: 2024-07-12 | End: 2024-07-12 | Stop reason: HOSPADM

## 2024-07-12 RX ORDER — ALBUTEROL SULFATE 0.83 MG/ML
2.5 SOLUTION RESPIRATORY (INHALATION) ONCE AS NEEDED
Status: DISCONTINUED | OUTPATIENT
Start: 2024-07-12 | End: 2024-07-12 | Stop reason: HOSPADM

## 2024-07-12 RX ORDER — OXYCODONE HYDROCHLORIDE 5 MG/1
5 TABLET ORAL EVERY 4 HOURS PRN
Status: DISCONTINUED | OUTPATIENT
Start: 2024-07-12 | End: 2024-07-12 | Stop reason: HOSPADM

## 2024-07-12 RX ORDER — ONDANSETRON HYDROCHLORIDE 2 MG/ML
4 INJECTION, SOLUTION INTRAVENOUS ONCE AS NEEDED
Status: DISCONTINUED | OUTPATIENT
Start: 2024-07-12 | End: 2024-07-12 | Stop reason: HOSPADM

## 2024-07-12 RX ORDER — MIDAZOLAM HYDROCHLORIDE 1 MG/ML
INJECTION, SOLUTION INTRAMUSCULAR; INTRAVENOUS AS NEEDED
Status: DISCONTINUED | OUTPATIENT
Start: 2024-07-12 | End: 2024-07-12

## 2024-07-12 RX ORDER — IPRATROPIUM BROMIDE 0.5 MG/2.5ML
500 SOLUTION RESPIRATORY (INHALATION) ONCE
Status: DISCONTINUED | OUTPATIENT
Start: 2024-07-12 | End: 2024-07-12 | Stop reason: HOSPADM

## 2024-07-12 RX ORDER — BUPIVACAINE HYDROCHLORIDE 5 MG/ML
INJECTION, SOLUTION PERINEURAL AS NEEDED
Status: DISCONTINUED | OUTPATIENT
Start: 2024-07-12 | End: 2024-07-12 | Stop reason: HOSPADM

## 2024-07-12 RX ORDER — HYDRALAZINE HYDROCHLORIDE 20 MG/ML
5 INJECTION INTRAMUSCULAR; INTRAVENOUS EVERY 30 MIN PRN
Status: DISCONTINUED | OUTPATIENT
Start: 2024-07-12 | End: 2024-07-12 | Stop reason: HOSPADM

## 2024-07-12 RX ORDER — PROPOFOL 10 MG/ML
INJECTION, EMULSION INTRAVENOUS AS NEEDED
Status: DISCONTINUED | OUTPATIENT
Start: 2024-07-12 | End: 2024-07-12

## 2024-07-12 RX ORDER — NORETHINDRONE AND ETHINYL ESTRADIOL 0.5-0.035
KIT ORAL AS NEEDED
Status: DISCONTINUED | OUTPATIENT
Start: 2024-07-12 | End: 2024-07-12

## 2024-07-12 RX ORDER — HYDROMORPHONE HYDROCHLORIDE 0.2 MG/ML
0.2 INJECTION INTRAMUSCULAR; INTRAVENOUS; SUBCUTANEOUS EVERY 5 MIN PRN
Status: DISCONTINUED | OUTPATIENT
Start: 2024-07-12 | End: 2024-07-12 | Stop reason: HOSPADM

## 2024-07-12 RX ORDER — ONDANSETRON HYDROCHLORIDE 2 MG/ML
INJECTION, SOLUTION INTRAVENOUS AS NEEDED
Status: DISCONTINUED | OUTPATIENT
Start: 2024-07-12 | End: 2024-07-12

## 2024-07-12 RX ORDER — DIPHENHYDRAMINE HYDROCHLORIDE 50 MG/ML
12.5 INJECTION INTRAMUSCULAR; INTRAVENOUS ONCE AS NEEDED
Status: DISCONTINUED | OUTPATIENT
Start: 2024-07-12 | End: 2024-07-12 | Stop reason: HOSPADM

## 2024-07-12 RX ORDER — FENTANYL CITRATE 50 UG/ML
INJECTION, SOLUTION INTRAMUSCULAR; INTRAVENOUS AS NEEDED
Status: DISCONTINUED | OUTPATIENT
Start: 2024-07-12 | End: 2024-07-12

## 2024-07-12 RX ORDER — OXYCODONE HYDROCHLORIDE 5 MG/1
5 TABLET ORAL EVERY 4 HOURS PRN
Qty: 28 TABLET | Refills: 0 | Status: SHIPPED | OUTPATIENT
Start: 2024-07-12 | End: 2024-07-19

## 2024-07-12 RX ORDER — LIDOCAINE HYDROCHLORIDE 20 MG/ML
INJECTION, SOLUTION INFILTRATION; PERINEURAL AS NEEDED
Status: DISCONTINUED | OUTPATIENT
Start: 2024-07-12 | End: 2024-07-12

## 2024-07-12 RX ORDER — MEPERIDINE HYDROCHLORIDE 25 MG/ML
12.5 INJECTION INTRAMUSCULAR; INTRAVENOUS; SUBCUTANEOUS EVERY 10 MIN PRN
Status: DISCONTINUED | OUTPATIENT
Start: 2024-07-12 | End: 2024-07-12 | Stop reason: HOSPADM

## 2024-07-12 SDOH — HEALTH STABILITY: MENTAL HEALTH: CURRENT SMOKER: 0

## 2024-07-12 ASSESSMENT — PAIN SCALES - GENERAL
PAINLEVEL_OUTOF10: 0 - NO PAIN
PAINLEVEL_OUTOF10: 0 - NO PAIN
PAINLEVEL_OUTOF10: 2
PAINLEVEL_OUTOF10: 0 - NO PAIN
PAINLEVEL_OUTOF10: 0 - NO PAIN
PAIN_LEVEL: 0
PAINLEVEL_OUTOF10: 0 - NO PAIN
PAINLEVEL_OUTOF10: 0 - NO PAIN
PAINLEVEL_OUTOF10: 2
PAINLEVEL_OUTOF10: 2
PAINLEVEL_OUTOF10: 0 - NO PAIN
PAINLEVEL_OUTOF10: 0 - NO PAIN
PAINLEVEL_OUTOF10: 4

## 2024-07-12 ASSESSMENT — PAIN - FUNCTIONAL ASSESSMENT

## 2024-07-12 ASSESSMENT — COLUMBIA-SUICIDE SEVERITY RATING SCALE - C-SSRS
2. HAVE YOU ACTUALLY HAD ANY THOUGHTS OF KILLING YOURSELF?: NO
1. IN THE PAST MONTH, HAVE YOU WISHED YOU WERE DEAD OR WISHED YOU COULD GO TO SLEEP AND NOT WAKE UP?: NO
6. HAVE YOU EVER DONE ANYTHING, STARTED TO DO ANYTHING, OR PREPARED TO DO ANYTHING TO END YOUR LIFE?: NO

## 2024-07-12 ASSESSMENT — PAIN DESCRIPTION - DESCRIPTORS: DESCRIPTORS: ACHING

## 2024-07-12 NOTE — ANESTHESIA POSTPROCEDURE EVALUATION
Patient: Regina Flaherty    Procedure Summary       Date: 07/12/24 Room / Location: [facility] OR 10 / Virtual [facility] OR    Anesthesia Start: 1017 Anesthesia Stop: 1137    Procedures:       Amputation Foot (Right: Foot)      Recession Gastrocnemius (Right: Foot) Diagnosis:       Osteophyte of right foot      Contracture of right ankle      (Osteophyte of right foot [M25.774])      (Contracture of right ankle [M24.571])    Surgeons: Carrie Magallon DPM Responsible Provider: Marcos Foster MD    Anesthesia Type: general ASA Status: 2            Anesthesia Type: general    Vitals Value Taken Time   /87 07/12/24 1226   Temp 36.1 °C (97 °F) 07/12/24 1150   Pulse 56 07/12/24 1228   Resp 13 07/12/24 1227   SpO2 92 % 07/12/24 1227   Vitals shown include unfiled device data.    Anesthesia Post Evaluation    Patient location during evaluation: PACU  Patient participation: complete - patient participated  Level of consciousness: awake  Pain score: 0  Pain management: adequate  Multimodal analgesia pain management approach  Airway patency: patent  Two or more strategies used to mitigate risk of obstructive sleep apnea  Cardiovascular status: acceptable  Respiratory status: acceptable  Hydration status: acceptable  Postoperative Nausea and Vomiting: none        There were no known notable events for this encounter.

## 2024-07-12 NOTE — OP NOTE
Amputation Foot (R), Recession Gastrocnemius (R) Operative Note     Date: 2024  OR Location: [facility]    Name: Regina Flaherty, : 1960, Age: 63 y.o., MRN: [id number], Sex: female    Diagnosis  PRE OP DIAGNOSIS - OSTEOPHYTE RIGHT FOOT M25.774    Procedures  82855 Incision bone cortex  03577 Complex wound closure   87660 Complex wound closure each additional 5 cm       Surgeons      * Carrie Magallon - Primary    Resident/Fellow/Other Assistant:  Dr. Jaren Soto, PGY-3  Dr. Josafat Odonnell, PGY-1    Procedure Summary  Anesthesia: General  ASA: II  Anesthesia Staff: Anesthesiologist: Marcos Foster MD  CRNA: BRYAN Hairston-CRNA  Estimated Blood Loss: 5 mL  Intra-op Medications:   Administrations occurring from 1000 to 1230 on 24:   Medication Name Total Dose   lidocaine (Xylocaine) 10 mg/mL (1 %) injection 10 mL   BUPivacaine HCl (Marcaine) 0.5 % (5 mg/mL) injection 10 mL   clindamycin (Cleocin) 900 mg in dextrose 5% IV 50 mL 900 mg              Anesthesia Record               Intraprocedure I/O Totals          Intake    clindamycin (Cleocin) 900 mg in dextrose 5% IV 50 mL 50.00 mL    Total Intake 50 mL          Specimen:   ID Type Source Tests Collected by Time   1 : Right foot Tissue FOOT AMPUTATION RIGHT SURGICAL PATHOLOGY EXAM Carrie Magallon, DPM 2024 1051        Staff:   Scrub Person: Mckinley  Scrub Person: Leandra  Circulator: Nisha  [facility]  Circulator: Belkys         Drains and/or Catheters: * None in log *    Tourniquet Times:   Ankle Tourniquet inflated to 250 mm Hg    Implants: none    Findings: see below    Indications: Regina Flaherty is an 63 y.o. female who is having surgery for non healing ulceration with callus formation right foot.  She has had waxing and waning of an ulceration to her right foot and radiographs demonstrated exostosis formation and lacking of a metatarsal parabola.  At this time it was discussed with the patient performing a revionsal TMA  amputation.  We did discuss a gastrocnemius recession but when she googled the procedure and would like to avoid doing this.  Therefore a written consent was obtained and scanned into her chart with the procedure that was being performed today of a revisional transmetatarsal amputation right foot.    Patient was consulted at length regarding the goals, risks, and benefits of surgical intervention and the most common complications including delayed healing, mal position, infection, numbness, swelling, DVT, and residual pain and possible need for revisional surgery. Also discussed were idiosyncratic risks such as loss of limb and/or death associated with adverse reactions to medications, anesthesia, VTE, or infection were discussed at length with the patient.  Expectations and goals of return to weightbearing/work/school time were discussed as tentative and somewhat variable upon patient's symptomatology postoperatively. Patient was encouraged to call or present to the office or my personal cell phone number if there are any unanswered questions.  The patient understands that one prescription for pain medication will be dispensed at the time of surgery only if needed. If they need a second prescription, they will receive a pain management referral at that time, along with the refill, and there will be no further narcotic pain medication dispensed after the one refill.  Patient does wish to proceed with planned surgical intervention at the conclusion of this conversation. Significant time was spent filling out, and orally reiterating all printed preoperative paperwork and fully explaining intended procedure in layman's terms as well as confirming laterality. Patient denied having further questions at this time regarding the intended surgical procedure(s) as mentioned above.        Procedure Details:     Patient was transferred from the pre operative holding area to the OR and placed on the OR table in a secure supine  position.  An ankle block was performed of the right ankle prior to prepping. Anesthesia was administered per the anesthesiologist.  A well padded ankle tourniquet was applied to the right lower extremity.  The surgical extremity was prepped and draped in sterile aseptic technique.  An appropriate time out was performed and all in the room were in agreement.      08667 Incision bone cortex  Attention was then directed to the right TMA site where an incision was made through anatomical layers to the level of bone. The incision measured 10 cm in length.  There was noted to be significant hypertrophic bone formation at the distal aspect of metatarsal 3, with to a lesser degree metatarsals 1,2,4 and 5.  Attention was then directed to the most distal aspect where a sagittal saw was utilized to remove the exostosis and recreate a biomechanically sound metatarsal parabola.  The sesamoids were also excised at this time.  There were no signs of infection noted.  The incision site was irrigated with copious amounts of sterile saline.  The distal stumps of the metatarsals were cauterized and bone wax placed to deter any recurrent bone formation.      15421  Attention was then directed to the incision which had a previous ulceration at the most lateral aspect of the incision which was incised.  The incision was closed in anatomical layers as noted below.  The incision measured 10 cm in length total.    The incision was closed in anatomical layers utilizing 2.0 and 3.0 vicryl and 2.0 and 3.0 nylon.  A dry, sterile dressing was applied consisting of adaptac, 4x4 gauze, kerlex and a well padded posterior splint.    The patient tolerated the procedure and anesthesia well and without complication.  The patient was transferred from the OR to the PACU with all vital signs stable and vascular status unchanged to the surgical extremity.  The ankle tourniquet was released and a brisk hyperemic response was noted to the TMA site right  foot.     Complications:  None; patient tolerated the procedure well.    Disposition: PACU - hemodynamically stable.  Condition: stable         Carrie CARMELO MedranoNaun  Phone Number: [phone]

## 2024-07-12 NOTE — DISCHARGE INSTRUCTIONS
PODIATRY DISCHARGE INSTRUCTIONS  Please call to schedule appointment with Dr. Magallon for 1st postoperative visit in 1 week after discharge or sooner if any problems or questions arise.  Please remain partial weight bearing to the heel with surgical shoe in place.  Keep dressing clean and dry until your first follow up appointment.  Do not shower unless using a cast protector to protect dressing.  If dressing gets wet, change or call office immediately as this can lead to increased risk of infection.  Place ice pack behind knee of extremity.  Elevate surgical extremity as much as possible to help with pain and swelling.  Should any problems, questions, or concerns arise, please call the clinic office.

## 2024-07-12 NOTE — ANESTHESIA PROCEDURE NOTES
Airway  Date/Time: 7/12/2024 10:28 AM  Urgency: elective    Airway not difficult    Staffing  Performed: CRNA   Authorized by: Marcos Foster MD    Performed by: BRYNA Hairston-PIYUSH  Patient location during procedure: OR    Indications and Patient Condition  Indications for airway management: anesthesia  Spontaneous ventilation: present  Sedation level: deep  Preoxygenated: yes  Patient position: sniffing  Mask difficulty assessment: 1 - vent by mask    Final Airway Details  Final airway type: endotracheal airway      Successful airway: ETT  Cuffed: yes   Successful intubation technique: video laryngoscopy  Facilitating devices/methods: intubating stylet  Endotracheal tube insertion site: oral  Blade: Victoriano  Blade size: #3  ETT size (mm): 7.0  Cormack-Lehane Classification: grade I - full view of glottis  Placement verified by: chest auscultation and capnometry   Cuff volume (mL): 8  Measured from: teeth  ETT to teeth (cm): 21  Number of attempts at approach: 1

## 2024-07-12 NOTE — ANESTHESIA PREPROCEDURE EVALUATION
Patient: Regina Flaherty    Procedure Information       Date/Time: 07/12/24 1000    Procedures:       Amputation Foot (Right)      Recession Gastrocnemius (Right)    Location: [facility] / [facility]    Surgeons: Carrie Magallon DPM            Relevant Problems   Anesthesia (within normal limits)      Cardiac  Cardiac clearance by Robbin.  EKG changes slow R wave progression and not MI   (+) Abnormal ECG      Pulmonary (within normal limits)      Neuro (within normal limits)      GI (within normal limits)      /Renal (within normal limits)      Liver (within normal limits)      Endocrine (within normal limits)      Hematology (within normal limits)      Musculoskeletal (within normal limits)      HEENT (within normal limits)      ID   (+) Osteomyelitis of ankle and foot (Multi)      Skin (within normal limits)      GYN (within normal limits)       Clinical information reviewed:   Tobacco  Allergies  Meds   Med Hx  Surg Hx  OB Status  Fam Hx  Soc   Hx      Allergies   Allergen Reactions    Vancomycin Other     Minor Syndrome    Zosyn [Piperacillin-Tazobactam] Anaphylaxis    Bactrim [Sulfamethoxazole-Trimethoprim] Hives and Rash    Doxycycline Rash     Sore throat    Keflex [Cephalexin] Rash   ;l;lergy  Prior to Admission medications    Medication Sig Start Date End Date Taking? Authorizing Provider   acetaminophen (Tylenol 8 HOUR) 650 mg ER tablet Take 1 tablet (650 mg) by mouth every 8 hours if needed for mild pain (1 - 3). Do not crush, chew, or split.   Yes Historical Provider, MD   ascorbic acid (Vitamin C) 1,000 mg tablet Take 2 tablets (2,000 mg) by mouth once daily.   Yes Historical Provider, MD   ascorbic acid/collagen hydr (COLLAGEN SKIN RENEWAL ORAL) Take 2,500 mg by mouth once daily.   Yes Historical Provider, MD   biotin 5 mg capsule Take 2 capsules (10 mg) by mouth once daily.   Yes Historical Provider, MD   co-enzyme Q-10 50 mg capsule Take 2 capsules by mouth once daily.   Yes  Historical Provider, MD   cyanocobalamin (Vitamin B-12) 500 mcg tablet Take 1 tablet (500 mcg) by mouth once daily.   Yes Historical Provider, MD   multivitamin with minerals iron-free (Centrum Silver) Take 1 tablet by mouth once daily.   Yes Historical Provider, MD   aspirin 81 mg EC tablet Take 1 tablet (81 mg) by mouth once daily. Stopped 2024    Historical Provider, MD   glucosamine HCl/chondroitin snell (GLUCOSAMINE-CHONDROITIN ORAL) Take 2 tablets by mouth once daily. 1500/1200  Stopped 2024    Historical Provider, MD   pantoprazole (ProtoNix) 40 mg EC tablet Take 1 tablet (40 mg) by mouth once daily. Do not crush, chew, or split. Do not start before 2024.  Patient not taking: Reported on 7/10/2024 3/21/24   DORCAS Goff   pyridoxine (Vitamin B-6) 50 mg tablet Take 1 tablet (50 mg) by mouth once daily. Do not start before 2024.  Patient not taking: Reported on 7/10/2024 3/21/24   DORCAS Goff     Past Medical History:   Diagnosis Date    Arthritis     Diabetes mellitus (Multi)      Past Surgical History:   Procedure Laterality Date    OTHER SURGICAL HISTORY  2019     section    OTHER SURGICAL HISTORY  2019    Hysterectomy    OTHER SURGICAL HISTORY  2019    Cystocele repair    OTHER SURGICAL HISTORY  2019    Mid-urethral sling insertion    OTHER SURGICAL HISTORY  2019    Abdominal panniculectomy    OTHER SURGICAL HISTORY  2019    Foot surgery    OTHER SURGICAL HISTORY  11/15/2019    Sacrocolpopexy    OTHER SURGICAL HISTORY  11/15/2019    Rectocele repair         NPO Detail:  NPO/Void Status  Date of Last Liquid: 24  Time of Last Liquid: 630  Date of Last Solid: 24  Time of Last Solid:   Last Intake Type: Clear fluids  Time of Last Void: 700         Physical Exam    Airway  Mallampati: I  TM distance: >3 FB  Neck ROM: full     Cardiovascular - normal exam     Dental - normal exam     Pulmonary - normal  exam     Abdominal            Anesthesia Plan    History of general anesthesia?: yes  History of complications of general anesthesia?: no    ASA 2     general     The patient is not a current smoker.  Patient was not previously instructed to abstain from smoking on day of procedure.  Patient did not smoke on day of procedure.  Education provided regarding risk of obstructive sleep apnea.  intravenous induction   Anesthetic plan and risks discussed with patient.    Plan discussed with CRNA.

## 2024-07-18 ENCOUNTER — OFFICE VISIT (OUTPATIENT)
Dept: WOUND CARE | Facility: HOSPITAL | Age: 64
End: 2024-07-18
Payer: COMMERCIAL

## 2024-07-18 PROCEDURE — 99213 OFFICE O/P EST LOW 20 MIN: CPT

## 2024-07-25 ENCOUNTER — APPOINTMENT (OUTPATIENT)
Dept: WOUND CARE | Facility: HOSPITAL | Age: 64
End: 2024-07-25
Payer: COMMERCIAL

## 2024-07-25 ENCOUNTER — OFFICE VISIT (OUTPATIENT)
Dept: WOUND CARE | Facility: HOSPITAL | Age: 64
End: 2024-07-25
Payer: COMMERCIAL

## 2024-07-25 PROCEDURE — 99213 OFFICE O/P EST LOW 20 MIN: CPT

## 2024-07-31 ENCOUNTER — HOSPITAL ENCOUNTER (OUTPATIENT)
Dept: CARDIOLOGY | Facility: HOSPITAL | Age: 64
Discharge: HOME | End: 2024-07-31
Payer: COMMERCIAL

## 2024-07-31 DIAGNOSIS — R94.31 ABNORMAL ECG: ICD-10-CM

## 2024-07-31 LAB
AORTIC VALVE MEAN GRADIENT: 5.4 MMHG
AORTIC VALVE PEAK VELOCITY: 1.61 M/S
AV PEAK GRADIENT: 10.4 MMHG
AVA (PEAK VEL): 2 CM2
AVA (VTI): 2.32 CM2
EJECTION FRACTION APICAL 4 CHAMBER: 56.3
EJECTION FRACTION: 63 %
LEFT ATRIUM VOLUME AREA LENGTH INDEX BSA: 48.1 ML/M2
LEFT VENTRICLE INTERNAL DIMENSION DIASTOLE: 4.39 CM (ref 3.5–6)
LEFT VENTRICULAR OUTFLOW TRACT DIAMETER: 2.13 CM
LV EJECTION FRACTION BIPLANE: 56 %
MITRAL VALVE E/A RATIO: 0.7
MITRAL VALVE E/E' RATIO: 10.5
RIGHT VENTRICLE FREE WALL PEAK S': 15.73 CM/S
TRICUSPID ANNULAR PLANE SYSTOLIC EXCURSION: 2.4 CM

## 2024-07-31 PROCEDURE — 93306 TTE W/DOPPLER COMPLETE: CPT

## 2024-07-31 PROCEDURE — 93306 TTE W/DOPPLER COMPLETE: CPT | Performed by: INTERNAL MEDICINE

## 2024-08-01 ENCOUNTER — OFFICE VISIT (OUTPATIENT)
Dept: WOUND CARE | Facility: HOSPITAL | Age: 64
End: 2024-08-01
Payer: COMMERCIAL

## 2024-08-01 PROCEDURE — 99214 OFFICE O/P EST MOD 30 MIN: CPT

## 2024-08-07 ENCOUNTER — OFFICE VISIT (OUTPATIENT)
Dept: CARDIOLOGY | Facility: CLINIC | Age: 64
End: 2024-08-07
Payer: COMMERCIAL

## 2024-08-07 VITALS
BODY MASS INDEX: 31 KG/M2 | DIASTOLIC BLOOD PRESSURE: 88 MMHG | WEIGHT: 192 LBS | SYSTOLIC BLOOD PRESSURE: 158 MMHG | OXYGEN SATURATION: 98 % | HEART RATE: 66 BPM

## 2024-08-07 DIAGNOSIS — Z01.810 PREOPERATIVE CARDIOVASCULAR EXAMINATION: Primary | ICD-10-CM

## 2024-08-07 PROCEDURE — 99213 OFFICE O/P EST LOW 20 MIN: CPT | Performed by: INTERNAL MEDICINE

## 2024-08-07 PROCEDURE — 1036F TOBACCO NON-USER: CPT | Performed by: INTERNAL MEDICINE

## 2024-08-07 ASSESSMENT — ENCOUNTER SYMPTOMS
OCCASIONAL FEELINGS OF UNSTEADINESS: 1
DEPRESSION: 0
LOSS OF SENSATION IN FEET: 0

## 2024-08-07 ASSESSMENT — PATIENT HEALTH QUESTIONNAIRE - PHQ9
SUM OF ALL RESPONSES TO PHQ9 QUESTIONS 1 AND 2: 0
2. FEELING DOWN, DEPRESSED OR HOPELESS: NOT AT ALL
1. LITTLE INTEREST OR PLEASURE IN DOING THINGS: NOT AT ALL

## 2024-08-07 ASSESSMENT — PAIN SCALES - GENERAL: PAINLEVEL: 0-NO PAIN

## 2024-08-07 NOTE — ASSESSMENT & PLAN NOTE
Echocardiogram from July 2024 shows normal LV systolic function without segmental wall motion abnormality and left ventricular ejection fraction 60 to 65% and very mild mitral regurgitation.    This suggests her previous EKG was actually showing slow R wave progression and not previous anteroseptal myocardial infarction.    She can proceed with bone spur resection/foot surgery with a low cardiac risk.  Can follow-up with me as needed from a cardiac standpoint

## 2024-08-07 NOTE — PROGRESS NOTES
63 Subjective      No chief complaint on file.       Here to review her echocardiogram that was done because of an abnormal EKG.  She has had bilateral foot transmetatarsal amputation for osteomyelitis and is undergoing surgeries for bone spurs now.    Previous: Referred to our office for preoperative cardiovascular risk assessment.  She has a history of chronic foot bone infections with resection of her 10 toes with transverse metatarsal amputations because of these recurrent infections/osteomyelitis that will not resolve with antibiotic therapy.  She now has bone spurs in both feet that required intervention.        EKG showed sinus rhythm at a heart rate of 60 bpm, leftward axis deviation and what was interpreted as anteroseptal myocardial infarction but I would suggest that this may be just slow R wave progression which would be a normal variant.  There is no acute ischemia, there was evidence of a first-degree AV block, and QTc of 412ms           Review of Systems   All other systems reviewed and are negative.       Objective   Physical Exam  Constitutional:       Appearance: Normal appearance.   HENT:      Head: Normocephalic and atraumatic.   Eyes:      Pupils: Pupils are equal, round, and reactive to light.   Cardiovascular:      Rate and Rhythm: Normal rate and regular rhythm.      Pulses: Normal pulses.      Heart sounds: Normal heart sounds.   Pulmonary:      Effort: Pulmonary effort is normal.      Breath sounds: Normal breath sounds.   Abdominal:      General: Abdomen is flat. Bowel sounds are normal.      Palpations: Abdomen is soft.   Musculoskeletal:         General: Normal range of motion.      Cervical back: Normal range of motion.   Skin:     General: Skin is warm and dry.   Neurological:      General: No focal deficit present.   Psychiatric:         Mood and Affect: Mood normal.         Judgment: Judgment normal.          Lab Review:   Not applicable    Preoperative cardiovascular  examination  Echocardiogram from July 2024 shows normal LV systolic function without segmental wall motion abnormality and left ventricular ejection fraction 60 to 65% and very mild mitral regurgitation.    This suggests her previous EKG was actually showing slow R wave progression and not previous anteroseptal myocardial infarction.    She can proceed with bone spur resection/foot surgery with a low cardiac risk.  Can follow-up with me as needed from a cardiac standpoint

## 2024-08-08 ENCOUNTER — OFFICE VISIT (OUTPATIENT)
Dept: WOUND CARE | Facility: HOSPITAL | Age: 64
End: 2024-08-08
Payer: COMMERCIAL

## 2024-08-08 PROCEDURE — 11042 DBRDMT SUBQ TIS 1ST 20SQCM/<: CPT

## 2024-08-15 ENCOUNTER — APPOINTMENT (OUTPATIENT)
Dept: WOUND CARE | Facility: HOSPITAL | Age: 64
End: 2024-08-15
Payer: COMMERCIAL

## 2024-08-16 ENCOUNTER — PRE-ADMISSION TESTING (OUTPATIENT)
Dept: PREADMISSION TESTING | Facility: HOSPITAL | Age: 64
End: 2024-08-16
Payer: COMMERCIAL

## 2024-08-16 VITALS
WEIGHT: 193.5 LBS | RESPIRATION RATE: 16 BRPM | OXYGEN SATURATION: 98 % | DIASTOLIC BLOOD PRESSURE: 81 MMHG | HEIGHT: 66 IN | BODY MASS INDEX: 31.1 KG/M2 | HEART RATE: 63 BPM | TEMPERATURE: 97.5 F | SYSTOLIC BLOOD PRESSURE: 164 MMHG

## 2024-08-16 PROCEDURE — 99203 OFFICE O/P NEW LOW 30 MIN: CPT

## 2024-08-16 ASSESSMENT — DUKE ACTIVITY SCORE INDEX (DASI)
CAN YOU WALK INDOORS, SUCH AS AROUND YOUR HOUSE: YES
CAN YOU TAKE CARE OF YOURSELF (EAT, DRESS, BATHE, OR USE TOILET): YES
DASI METS SCORE: 7
CAN YOU DO HEAVY WORK AROUND THE HOUSE LIKE SCRUBBING FLOORS OR LIFTING AND MOVING HEAVY FURNITURE: NO
CAN YOU WALK A BLOCK OR TWO ON LEVEL GROUND: YES
TOTAL_SCORE: 34.7
CAN YOU DO YARD WORK LIKE RAKING LEAVES, WEEDING OR PUSHING A MOWER: YES
CAN YOU PARTICIPATE IN STRENOUS SPORTS LIKE SWIMMING, SINGLES TENNIS, FOOTBALL, BASKETBALL, OR SKIING: NO
CAN YOU DO LIGHT WORK AROUND THE HOUSE LIKE DUSTING OR WASHING DISHES: YES
CAN YOU DO MODERATE WORK AROUND THE HOUSE LIKE VACUUMING, SWEEPING FLOORS OR CARRYING GROCERIES: YES
CAN YOU HAVE SEXUAL RELATIONS: YES
CAN YOU RUN A SHORT DISTANCE: NO
CAN YOU PARTICIPATE IN MODERATE RECREATIONAL ACTIVITIES LIKE GOLF, BOWLING, DANCING, DOUBLES TENNIS OR THROWING A BASEBALL OR FOOTBALL: YES
CAN YOU CLIMB A FLIGHT OF STAIRS OR WALK UP A HILL: YES

## 2024-08-16 ASSESSMENT — ENCOUNTER SYMPTOMS
NEUROLOGICAL NEGATIVE: 1
RESPIRATORY NEGATIVE: 1
PSYCHIATRIC NEGATIVE: 1
EYES NEGATIVE: 1
GASTROINTESTINAL NEGATIVE: 1
CARDIOVASCULAR NEGATIVE: 1
HEMATOLOGIC/LYMPHATIC NEGATIVE: 1
ALLERGIC/IMMUNOLOGIC NEGATIVE: 1
CONSTITUTIONAL NEGATIVE: 1
ENDOCRINE NEGATIVE: 1

## 2024-08-16 ASSESSMENT — PAIN SCALES - GENERAL: PAINLEVEL_OUTOF10: 0 - NO PAIN

## 2024-08-16 ASSESSMENT — PAIN - FUNCTIONAL ASSESSMENT: PAIN_FUNCTIONAL_ASSESSMENT: 0-10

## 2024-08-16 NOTE — CPM/PAT H&P
CPM/PAT Evaluation       Name: Regina Flaherty (Regina Flaherty)  /Age: 1960/63 y.o.     In-Person       Chief Complaint: Bone spurs    HPI Regina Flaherty is a 63 year old female  Patient is status post bilateral transmetatarsal amputations, resulting in subsequent pain and difficulty with ambulation walking with her left foot. Patient also reports the development of uncomfortable calluses to her left foot. She has followed with Dr. Magallon for podiatry at the [facility] center for some time. Patient was evaluated by Dr. Magallon who recommended recommended removal of the bone spurs on the left foot . She denies fever, chills, nausea, vomiting, sob, chest pain, and palpitations. She is scheduled for incision and drainage, bone, left lower extremity and wound closure .    Past Medical History:   Diagnosis Date    Arthritis     Diabetes mellitus (Multi)        Past Surgical History:   Procedure Laterality Date    OTHER SURGICAL HISTORY  2019     section    OTHER SURGICAL HISTORY  2019    Hysterectomy    OTHER SURGICAL HISTORY  2019    Cystocele repair    OTHER SURGICAL HISTORY  2019    Mid-urethral sling insertion    OTHER SURGICAL HISTORY  2019    Abdominal panniculectomy    OTHER SURGICAL HISTORY  2019    Bilateral transmetatarasal amputation    OTHER SURGICAL HISTORY  11/15/2019    Sacrocolpopexy    OTHER SURGICAL HISTORY  11/15/2019    Rectocele repair       Social History     Tobacco Use    Smoking status: Former     Current packs/day: 0.00     Average packs/day: 0.5 packs/day for 4.0 years (2.0 ttl pk-yrs)     Types: Cigarettes     Start date:      Quit date:      Years since quittin.6    Smokeless tobacco: Never   Substance Use Topics    Alcohol use: Not Currently     Social History     Substance and Sexual Activity   Drug Use Never         No family history on file.    Allergies   Allergen Reactions    Vancomycin Other     Minor Syndrome    Zosyn  [Piperacillin-Tazobactam] Anaphylaxis    Bactrim [Sulfamethoxazole-Trimethoprim] Hives and Rash    Doxycycline Rash     Sore throat    Keflex [Cephalexin] Rash       Current Outpatient Medications   Medication Sig Dispense Refill    acetaminophen (Tylenol 8 HOUR) 650 mg ER tablet Take 1 tablet (650 mg) by mouth every 8 hours if needed for mild pain (1 - 3). Do not crush, chew, or split.      ascorbic acid (Vitamin C) 1,000 mg tablet Take 2 tablets (2,000 mg) by mouth once daily.      ascorbic acid/collagen hydr (COLLAGEN SKIN RENEWAL ORAL) Take 2,500 mg by mouth once daily.      aspirin 81 mg EC tablet Take 1 tablet (81 mg) by mouth once daily. Stopped 7-6-2024      biotin 5 mg capsule Take 2 capsules (10 mg) by mouth once daily.      co-enzyme Q-10 50 mg capsule Take 2 capsules by mouth once daily.      cyanocobalamin (Vitamin B-12) 500 mcg tablet Take 1 tablet (500 mcg) by mouth once daily.      glucosamine HCl/chondroitin snell (GLUCOSAMINE-CHONDROITIN ORAL) Take 2 tablets by mouth once daily. 1500/1200  Stopped 7-6-2024      multivitamin with minerals iron-free (Centrum Silver) Take 1 tablet by mouth once daily.       No current facility-administered medications for this visit.     Review of Systems   Constitutional: Negative.    HENT: Negative.     Eyes: Negative.    Respiratory: Negative.     Cardiovascular: Negative.    Gastrointestinal: Negative.    Endocrine: Negative.    Genitourinary: Negative.    Musculoskeletal:         Bilateral foot transmetatarsal amputation  Left foot bone spurs     Skin: Negative.    Allergic/Immunologic: Negative.    Neurological: Negative.    Hematological: Negative.    Psychiatric/Behavioral: Negative.                Physical Exam  Vitals reviewed.   Constitutional:       Appearance: Normal appearance.   HENT:      Head: Normocephalic and atraumatic.      Nose: Nose normal.      Mouth/Throat:      Mouth: Mucous membranes are moist.      Pharynx: Oropharynx is clear.   Eyes:       "Extraocular Movements: Extraocular movements intact.      Conjunctiva/sclera: Conjunctivae normal.   Cardiovascular:      Rate and Rhythm: Normal rate and regular rhythm.      Pulses: Normal pulses.      Heart sounds: Normal heart sounds.   Pulmonary:      Effort: Pulmonary effort is normal.      Breath sounds: Normal breath sounds.   Abdominal:      General: Bowel sounds are normal.      Palpations: Abdomen is soft.   Genitourinary:     Comments: Assessment referred to physician    Musculoskeletal:      Cervical back: Normal range of motion.      Comments: Bilateral foot transmetatarsal amputations   Skin:     General: Skin is warm and dry.   Neurological:      General: No focal deficit present.      Mental Status: She is alert and oriented to person, place, and time.   Psychiatric:         Mood and Affect: Mood normal.         Behavior: Behavior normal.         Thought Content: Thought content normal.         Judgment: Judgment normal.          PAT AIRWAY:   Airway:     Mallampati::  III    TM distance::  >3 FB    Neck ROM::  Full  normal      /81   Pulse 63   Temp 36.4 °C (97.5 °F) (Temporal)   Resp 16   Ht 1.676 m (5' 5.98\")   Wt 87.8 kg (193 lb 8 oz)   SpO2 98%   BMI 31.25 kg/m²     ASA: 2  ADAM: 1.9  RCRI: 0.4%      DASI Risk Score      Flowsheet Row Most Recent Value   DASI SCORE 34.7   METS Score (Will be calculated only when all the questions are answered) 7          Caprini DVT Assessment    No data to display       Modified Frailty Index    No data to display       CHADS2 Stroke Risk  Current as of 21 minutes ago        N/A 3 to 100%: High Risk   2 to < 3%: Medium Risk   0 to < 2%: Low Risk     Last Change: N/A          This score determines the patient's risk of having a stroke if the patient has atrial fibrillation.        This score is not applicable to this patient. Components are not calculated.          Revised Cardiac Risk Index      Flowsheet Row Most Recent Value   Revised Cardiac " Risk Calculator 0          Apfel Simplified Score    No data to display       Risk Analysis Index Results This Encounter    No data found in the last 1 encounters.       Stop Bang Score      Flowsheet Row Most Recent Value   Do you snore loudly? 0   Do you often feel tired or fatigued after your sleep? 0   Has anyone ever observed you stop breathing in your sleep? 0   Do you have or are you being treated for high blood pressure? 0   Recent BMI (Calculated) 31.3   Is BMI greater than 35 kg/m2? 0=No   Age older than 50 years old? 1=Yes   Is your neck circumference greater than 17 inches (Male) or 16 inches (Female)? 0   Gender - Male 0=No   STOP-BANG Total Score 1            Assessment and Plan:     Acute osteomyelitis of left ankle or foot (Multi , Osteophyte of left foot : INCISION AND DRAINAGE,BONE,LOWER EXTREMITY , Wound Closure   BMI: 31.25    LABS 7/5/24  EKG 7/5/24    TTE Dr. Ramos 7/31/24:    CONCLUSIONS:   1. The left ventricular systolic function is normal, with a visually estimated ejection fraction of 60-65%.   2. Spectral Doppler shows an impaired relaxation pattern of left ventricular diastolic filling.   3. There is normal right ventricular global systolic function.   4. Mild mitral valve regurgitation.      Cardiac Clearance: Dr. Foster 8/7/24  Preoperative cardiovascular examination  Echocardiogram from July 2024 shows normal LV systolic function without segmental wall motion abnormality and left ventricular ejection fraction 60 to 65% and very mild mitral regurgitation.     This suggests her previous EKG was actually showing slow R wave progression and not previous anteroseptal myocardial infarction.     She can proceed with bone spur resection/foot surgery with a low cardiac risk.  Can follow-up with me as needed from a cardiac standpoint      Jes Og, APRN-CNP

## 2024-08-16 NOTE — H&P (VIEW-ONLY)
CPM/PAT Evaluation       Name: Regina Flaherty (Regina Flaherty)  /Age: 1960/63 y.o.     In-Person       Chief Complaint: Bone spurs    HPI Regina Flaherty is a 63 year old female  Patient is status post bilateral transmetatarsal amputations, resulting in subsequent pain and difficulty with ambulation walking with her left foot. Patient also reports the development of uncomfortable calluses to her left foot. She has followed with Dr. Magallon for podiatry at the [facility] center for some time. Patient was evaluated by Dr. Magallon who recommended recommended removal of the bone spurs on the left foot . She denies fever, chills, nausea, vomiting, sob, chest pain, and palpitations. She is scheduled for incision and drainage, bone, left lower extremity and wound closure .    Past Medical History:   Diagnosis Date    Arthritis     Diabetes mellitus (Multi)        Past Surgical History:   Procedure Laterality Date    OTHER SURGICAL HISTORY  2019     section    OTHER SURGICAL HISTORY  2019    Hysterectomy    OTHER SURGICAL HISTORY  2019    Cystocele repair    OTHER SURGICAL HISTORY  2019    Mid-urethral sling insertion    OTHER SURGICAL HISTORY  2019    Abdominal panniculectomy    OTHER SURGICAL HISTORY  2019    Bilateral transmetatarasal amputation    OTHER SURGICAL HISTORY  11/15/2019    Sacrocolpopexy    OTHER SURGICAL HISTORY  11/15/2019    Rectocele repair       Social History     Tobacco Use    Smoking status: Former     Current packs/day: 0.00     Average packs/day: 0.5 packs/day for 4.0 years (2.0 ttl pk-yrs)     Types: Cigarettes     Start date:      Quit date:      Years since quittin.6    Smokeless tobacco: Never   Substance Use Topics    Alcohol use: Not Currently     Social History     Substance and Sexual Activity   Drug Use Never         No family history on file.    Allergies   Allergen Reactions    Vancomycin Other     Minor Syndrome    Zosyn  [Piperacillin-Tazobactam] Anaphylaxis    Bactrim [Sulfamethoxazole-Trimethoprim] Hives and Rash    Doxycycline Rash     Sore throat    Keflex [Cephalexin] Rash       Current Outpatient Medications   Medication Sig Dispense Refill    acetaminophen (Tylenol 8 HOUR) 650 mg ER tablet Take 1 tablet (650 mg) by mouth every 8 hours if needed for mild pain (1 - 3). Do not crush, chew, or split.      ascorbic acid (Vitamin C) 1,000 mg tablet Take 2 tablets (2,000 mg) by mouth once daily.      ascorbic acid/collagen hydr (COLLAGEN SKIN RENEWAL ORAL) Take 2,500 mg by mouth once daily.      aspirin 81 mg EC tablet Take 1 tablet (81 mg) by mouth once daily. Stopped 7-6-2024      biotin 5 mg capsule Take 2 capsules (10 mg) by mouth once daily.      co-enzyme Q-10 50 mg capsule Take 2 capsules by mouth once daily.      cyanocobalamin (Vitamin B-12) 500 mcg tablet Take 1 tablet (500 mcg) by mouth once daily.      glucosamine HCl/chondroitin snell (GLUCOSAMINE-CHONDROITIN ORAL) Take 2 tablets by mouth once daily. 1500/1200  Stopped 7-6-2024      multivitamin with minerals iron-free (Centrum Silver) Take 1 tablet by mouth once daily.       No current facility-administered medications for this visit.     Review of Systems   Constitutional: Negative.    HENT: Negative.     Eyes: Negative.    Respiratory: Negative.     Cardiovascular: Negative.    Gastrointestinal: Negative.    Endocrine: Negative.    Genitourinary: Negative.    Musculoskeletal:         Bilateral foot transmetatarsal amputation  Left foot bone spurs     Skin: Negative.    Allergic/Immunologic: Negative.    Neurological: Negative.    Hematological: Negative.    Psychiatric/Behavioral: Negative.                Physical Exam  Vitals reviewed.   Constitutional:       Appearance: Normal appearance.   HENT:      Head: Normocephalic and atraumatic.      Nose: Nose normal.      Mouth/Throat:      Mouth: Mucous membranes are moist.      Pharynx: Oropharynx is clear.   Eyes:       "Extraocular Movements: Extraocular movements intact.      Conjunctiva/sclera: Conjunctivae normal.   Cardiovascular:      Rate and Rhythm: Normal rate and regular rhythm.      Pulses: Normal pulses.      Heart sounds: Normal heart sounds.   Pulmonary:      Effort: Pulmonary effort is normal.      Breath sounds: Normal breath sounds.   Abdominal:      General: Bowel sounds are normal.      Palpations: Abdomen is soft.   Genitourinary:     Comments: Assessment referred to physician    Musculoskeletal:      Cervical back: Normal range of motion.      Comments: Bilateral foot transmetatarsal amputations   Skin:     General: Skin is warm and dry.   Neurological:      General: No focal deficit present.      Mental Status: She is alert and oriented to person, place, and time.   Psychiatric:         Mood and Affect: Mood normal.         Behavior: Behavior normal.         Thought Content: Thought content normal.         Judgment: Judgment normal.          PAT AIRWAY:   Airway:     Mallampati::  III    TM distance::  >3 FB    Neck ROM::  Full  normal      /81   Pulse 63   Temp 36.4 °C (97.5 °F) (Temporal)   Resp 16   Ht 1.676 m (5' 5.98\")   Wt 87.8 kg (193 lb 8 oz)   SpO2 98%   BMI 31.25 kg/m²     ASA: 2  ADAM: 1.9  RCRI: 0.4%      DASI Risk Score      Flowsheet Row Most Recent Value   DASI SCORE 34.7   METS Score (Will be calculated only when all the questions are answered) 7          Caprini DVT Assessment    No data to display       Modified Frailty Index    No data to display       CHADS2 Stroke Risk  Current as of 21 minutes ago        N/A 3 to 100%: High Risk   2 to < 3%: Medium Risk   0 to < 2%: Low Risk     Last Change: N/A          This score determines the patient's risk of having a stroke if the patient has atrial fibrillation.        This score is not applicable to this patient. Components are not calculated.          Revised Cardiac Risk Index      Flowsheet Row Most Recent Value   Revised Cardiac " Risk Calculator 0          Apfel Simplified Score    No data to display       Risk Analysis Index Results This Encounter    No data found in the last 1 encounters.       Stop Bang Score      Flowsheet Row Most Recent Value   Do you snore loudly? 0   Do you often feel tired or fatigued after your sleep? 0   Has anyone ever observed you stop breathing in your sleep? 0   Do you have or are you being treated for high blood pressure? 0   Recent BMI (Calculated) 31.3   Is BMI greater than 35 kg/m2? 0=No   Age older than 50 years old? 1=Yes   Is your neck circumference greater than 17 inches (Male) or 16 inches (Female)? 0   Gender - Male 0=No   STOP-BANG Total Score 1            Assessment and Plan:     Acute osteomyelitis of left ankle or foot (Multi , Osteophyte of left foot : INCISION AND DRAINAGE,BONE,LOWER EXTREMITY , Wound Closure   BMI: 31.25    LABS 7/5/24  EKG 7/5/24    TTE Dr. Ramos 7/31/24:    CONCLUSIONS:   1. The left ventricular systolic function is normal, with a visually estimated ejection fraction of 60-65%.   2. Spectral Doppler shows an impaired relaxation pattern of left ventricular diastolic filling.   3. There is normal right ventricular global systolic function.   4. Mild mitral valve regurgitation.      Cardiac Clearance: Dr. Foster 8/7/24  Preoperative cardiovascular examination  Echocardiogram from July 2024 shows normal LV systolic function without segmental wall motion abnormality and left ventricular ejection fraction 60 to 65% and very mild mitral regurgitation.     This suggests her previous EKG was actually showing slow R wave progression and not previous anteroseptal myocardial infarction.     She can proceed with bone spur resection/foot surgery with a low cardiac risk.  Can follow-up with me as needed from a cardiac standpoint      Jes Og, APRN-CNP

## 2024-08-16 NOTE — PREPROCEDURE INSTRUCTIONS
Medication List            Accurate as of August 16, 2024  3:32 PM. Always use your most recent med list.                acetaminophen 650 mg ER tablet  Commonly known as: Tylenol 8 HOUR  Notes to patient: MAY TAKE MORNING OF SURGERY IF NEEDED     ascorbic acid 1,000 mg tablet  Commonly known as: Vitamin C  Medication Adjustments for Surgery: Stop 7 days before surgery     aspirin 81 mg EC tablet  Medication Adjustments for Surgery: Stop 7 days before surgery     biotin 5 mg capsule  Medication Adjustments for Surgery: Stop 7 days before surgery     coenzyme Q10 100 mg tablet  Medication Adjustments for Surgery: Stop 7 days before surgery     COLLAGEN SKIN RENEWAL ORAL  Medication Adjustments for Surgery: Stop 7 days before surgery     cyanocobalamin 500 mcg tablet  Commonly known as: Vitamin B-12  Medication Adjustments for Surgery: Stop 7 days before surgery     GLUCOSAMINE-CHONDROITIN ORAL  Medication Adjustments for Surgery: Stop 7 days before surgery     multivitamin with minerals iron-free  Commonly known as: Centrum Silver  Medication Adjustments for Surgery: Stop 7 days before surgery                              NPO Instructions:    Do not eat any food after midnight the night before your surgery/procedure.    Additional Instructions:     Day of Surgery:  Review your medication instructions, take indicated medications  Wear  comfortable loose fitting clothing  Do not use moisturizers, creams, lotions or perfume  All jewelry and valuables should be left at home        Preoperative Fasting Guidelines    Why must I stop eating and drinking near surgery time?  With sedation, food or liquid in your stomach can enter your lungs causing serious complications  Increases nausea and vomiting    When do I need to stop eating and drinking before my surgery?  Do not eat any food after midnight the night before your surgery/procedure.  You may have up to 13.5 ounces of clear liquid until TWO hours before your  instructed arrival time to the hospital.  This includes water, black tea/coffee, (no milk or cream) apple juice, and electrolyte drinks (Gatorade)  You may chew gum until TWO hours before your surgery/procedure    PAT DISCHARGE INSTRUCTIONS    Please call the Same Day Surgery (SDS) Department of the hospital where your procedure will be performed after 2:00 PM the day before your surgery. If you are scheduled on a Monday, or a Tuesday following a Monday holiday, you will need to call on the last business day prior to your surgery.    [address], [id number] [phone]  Second Floor      Please let your surgeon know if:      You develop any open sores, shingles, burning or painful urination as these may increase your risk of an infection.   You no longer wish to have the surgery.   Any other personal circumstances change that may lead to the need to cancel or defer this surgery-such as being sick or getting admitted to any hospital within one week of your planned procedure.    Your contact details change, such as a change of address or phone number.    Starting now:     Please DO NOT drink alcohol or smoke for 24 hours before surgery. It is well known that quitting smoking can make a huge difference to your health and recovery from surgery. The longer you abstain from smoking, the better your chances of a healthy recovery. If you need help with quitting, call 5-800QUIT-NOW to be connected to a trained counselor who will discuss the best methods to help you quit.     Before your surgery:    Please stop all supplements 7 days prior to surgery. Or as directed by your surgeon.   Please stop taking NSAID pain medicine such as Advil and Motrin 7 days before surgery.    If you develop any fever, cough, cold, rashes, cuts, scratches, scrapes, urinary symptoms or infection anywhere on your body (including teeth and gums) prior to surgery, please call your  surgeon’s office as soon as possible. This may require treatment to reduce the chance of cancellation on the day of surgery.    The day before your surgery:   DIET- Please follow the diet instructions at the top of your packet.   Get a good night’s rest.  Use the special soap for bathing if you have been instructed to use one.    Scheduled surgery times may change and you will be notified if this occurs - please check your personal voicemail for any updates.     On the morning of surgery:   Wear comfortable, loose fitting clothes which open in the front. Please do not wear moisturizers, creams, lotions, makeup or perfume.    Please bring with you to surgery:   Photo ID and insurance card   Current list of medicines and allergies   Pacemaker/ Defibrillator/Heart stent cards   CPAP machine and mask    Slings/ splints/ crutches   A copy of your complete advanced directive/DHPOA.    Please do NOT bring with you to surgery:   All jewelry and valuables should be left at home.   Prosthetic devices such as contact lenses, hearing aids, dentures, eyelash extensions, hairpins and body piercings must be removed prior to going in to the surgical suite.    After outpatient surgery:   A responsible adult MUST accompany you at the time of discharge and stay with you for 24 hours after your surgery. You may NOT drive yourself home after surgery.    Do not drive, operate machinery, make critical decisions or do activities that require co-ordination or balance until after a night’s sleep.   Do not drink alcoholic beverages for 24 hours.   Instructions for resuming your medications will be provided by your surgeon.    CALL YOUR DOCTOR AFTER SURGERY IF YOU HAVE:     Chills and/or a fever of 101° F or higher.    Redness, swelling, pus or drainage from your surgical wound or a bad smell from the wound.    Lightheadedness, fainting or confusion.    Persistent vomiting (throwing up) and are not able to eat or drink for 12 hours.    Three  or more loose, watery bowel movements in 24 hours (diarrhea).   Difficulty or pain while urinating( after non-urological surgery)    Pain and swelling in your legs, especially if it is only on one side.    Difficulty breathing or are breathing faster than normal.    Any new concerning symptoms.

## 2024-08-22 ENCOUNTER — APPOINTMENT (OUTPATIENT)
Dept: WOUND CARE | Facility: HOSPITAL | Age: 64
End: 2024-08-22
Payer: COMMERCIAL

## 2024-08-22 ENCOUNTER — ANESTHESIA EVENT (OUTPATIENT)
Dept: OPERATING ROOM | Facility: HOSPITAL | Age: 64
End: 2024-08-22
Payer: COMMERCIAL

## 2024-08-23 ENCOUNTER — APPOINTMENT (OUTPATIENT)
Dept: RADIOLOGY | Facility: HOSPITAL | Age: 64
End: 2024-08-23
Payer: COMMERCIAL

## 2024-08-23 ENCOUNTER — HOSPITAL ENCOUNTER (OUTPATIENT)
Facility: HOSPITAL | Age: 64
Setting detail: OUTPATIENT SURGERY
Discharge: HOME | End: 2024-08-23
Attending: PODIATRIST | Admitting: PODIATRIST
Payer: COMMERCIAL

## 2024-08-23 ENCOUNTER — ANESTHESIA (OUTPATIENT)
Dept: OPERATING ROOM | Facility: HOSPITAL | Age: 64
End: 2024-08-23
Payer: COMMERCIAL

## 2024-08-23 DIAGNOSIS — M25.775 OSTEOPHYTE OF LEFT FOOT: ICD-10-CM

## 2024-08-23 DIAGNOSIS — G89.18 POST-OPERATIVE PAIN: Primary | ICD-10-CM

## 2024-08-23 DIAGNOSIS — M86.172 ACUTE OSTEOMYELITIS OF LEFT ANKLE OR FOOT (MULTI): ICD-10-CM

## 2024-08-23 LAB — GLUCOSE BLD MANUAL STRIP-MCNC: 99 MG/DL (ref 74–99)

## 2024-08-23 PROCEDURE — 7100000010 HC PHASE TWO TIME - EACH INCREMENTAL 1 MINUTE: Performed by: PODIATRIST

## 2024-08-23 PROCEDURE — 2500000004 HC RX 250 GENERAL PHARMACY W/ HCPCS (ALT 636 FOR OP/ED): Performed by: PODIATRIST

## 2024-08-23 PROCEDURE — 3700000001 HC GENERAL ANESTHESIA TIME - INITIAL BASE CHARGE: Performed by: PODIATRIST

## 2024-08-23 PROCEDURE — 2500000005 HC RX 250 GENERAL PHARMACY W/O HCPCS: Performed by: PODIATRIST

## 2024-08-23 PROCEDURE — 88305 TISSUE EXAM BY PATHOLOGIST: CPT | Mod: TC | Performed by: PODIATRIST

## 2024-08-23 PROCEDURE — 3600000003 HC OR TIME - INITIAL BASE CHARGE - PROCEDURE LEVEL THREE: Performed by: PODIATRIST

## 2024-08-23 PROCEDURE — 3600000008 HC OR TIME - EACH INCREMENTAL 1 MINUTE - PROCEDURE LEVEL THREE: Performed by: PODIATRIST

## 2024-08-23 PROCEDURE — 2720000007 HC OR 272 NO HCPCS: Performed by: PODIATRIST

## 2024-08-23 PROCEDURE — 76000 FLUOROSCOPY <1 HR PHYS/QHP: CPT | Mod: 59

## 2024-08-23 PROCEDURE — 2500000005 HC RX 250 GENERAL PHARMACY W/O HCPCS

## 2024-08-23 PROCEDURE — 2500000004 HC RX 250 GENERAL PHARMACY W/ HCPCS (ALT 636 FOR OP/ED): Mod: JZ

## 2024-08-23 PROCEDURE — 2500000001 HC RX 250 WO HCPCS SELF ADMINISTERED DRUGS (ALT 637 FOR MEDICARE OP): Performed by: ANESTHESIOLOGY

## 2024-08-23 PROCEDURE — 82947 ASSAY GLUCOSE BLOOD QUANT: CPT

## 2024-08-23 PROCEDURE — 7100000001 HC RECOVERY ROOM TIME - INITIAL BASE CHARGE: Performed by: PODIATRIST

## 2024-08-23 PROCEDURE — 7100000009 HC PHASE TWO TIME - INITIAL BASE CHARGE: Performed by: PODIATRIST

## 2024-08-23 PROCEDURE — 7100000002 HC RECOVERY ROOM TIME - EACH INCREMENTAL 1 MINUTE: Performed by: PODIATRIST

## 2024-08-23 PROCEDURE — 3700000002 HC GENERAL ANESTHESIA TIME - EACH INCREMENTAL 1 MINUTE: Performed by: PODIATRIST

## 2024-08-23 PROCEDURE — 2500000004 HC RX 250 GENERAL PHARMACY W/ HCPCS (ALT 636 FOR OP/ED)

## 2024-08-23 RX ORDER — HYDROMORPHONE HYDROCHLORIDE 0.2 MG/ML
0.2 INJECTION INTRAMUSCULAR; INTRAVENOUS; SUBCUTANEOUS EVERY 5 MIN PRN
Status: DISCONTINUED | OUTPATIENT
Start: 2024-08-23 | End: 2024-08-28 | Stop reason: HOSPADM

## 2024-08-23 RX ORDER — MIDAZOLAM HYDROCHLORIDE 1 MG/ML
INJECTION, SOLUTION INTRAMUSCULAR; INTRAVENOUS AS NEEDED
Status: DISCONTINUED | OUTPATIENT
Start: 2024-08-23 | End: 2024-08-23

## 2024-08-23 RX ORDER — SODIUM CHLORIDE, SODIUM LACTATE, POTASSIUM CHLORIDE, CALCIUM CHLORIDE 600; 310; 30; 20 MG/100ML; MG/100ML; MG/100ML; MG/100ML
50 INJECTION, SOLUTION INTRAVENOUS CONTINUOUS
Status: DISCONTINUED | OUTPATIENT
Start: 2024-08-23 | End: 2024-08-28 | Stop reason: HOSPADM

## 2024-08-23 RX ORDER — ONDANSETRON HYDROCHLORIDE 8 MG/1
8 TABLET, FILM COATED ORAL EVERY 8 HOURS PRN
Qty: 20 TABLET | Refills: 0 | Status: SHIPPED | OUTPATIENT
Start: 2024-08-23

## 2024-08-23 RX ORDER — ONDANSETRON HYDROCHLORIDE 2 MG/ML
INJECTION, SOLUTION INTRAVENOUS AS NEEDED
Status: DISCONTINUED | OUTPATIENT
Start: 2024-08-23 | End: 2024-08-23

## 2024-08-23 RX ORDER — FENTANYL CITRATE 50 UG/ML
INJECTION, SOLUTION INTRAMUSCULAR; INTRAVENOUS AS NEEDED
Status: DISCONTINUED | OUTPATIENT
Start: 2024-08-23 | End: 2024-08-23

## 2024-08-23 RX ORDER — LIDOCAINE HYDROCHLORIDE 10 MG/ML
INJECTION INFILTRATION; PERINEURAL AS NEEDED
Status: DISCONTINUED | OUTPATIENT
Start: 2024-08-23 | End: 2024-08-23

## 2024-08-23 RX ORDER — MEPERIDINE HYDROCHLORIDE 25 MG/ML
12.5 INJECTION INTRAMUSCULAR; INTRAVENOUS; SUBCUTANEOUS EVERY 10 MIN PRN
Status: DISCONTINUED | OUTPATIENT
Start: 2024-08-23 | End: 2024-08-28 | Stop reason: HOSPADM

## 2024-08-23 RX ORDER — CLINDAMYCIN PHOSPHATE 900 MG/50ML
900 INJECTION, SOLUTION INTRAVENOUS ONCE
Status: COMPLETED | OUTPATIENT
Start: 2024-08-23 | End: 2024-08-23

## 2024-08-23 RX ORDER — SODIUM CHLORIDE, SODIUM LACTATE, POTASSIUM CHLORIDE, CALCIUM CHLORIDE 600; 310; 30; 20 MG/100ML; MG/100ML; MG/100ML; MG/100ML
100 INJECTION, SOLUTION INTRAVENOUS CONTINUOUS
Status: DISCONTINUED | OUTPATIENT
Start: 2024-08-23 | End: 2024-08-28 | Stop reason: HOSPADM

## 2024-08-23 RX ORDER — ONDANSETRON HYDROCHLORIDE 2 MG/ML
4 INJECTION, SOLUTION INTRAVENOUS ONCE AS NEEDED
Status: DISCONTINUED | OUTPATIENT
Start: 2024-08-23 | End: 2024-08-28 | Stop reason: HOSPADM

## 2024-08-23 RX ORDER — PROPOFOL 10 MG/ML
INJECTION, EMULSION INTRAVENOUS AS NEEDED
Status: DISCONTINUED | OUTPATIENT
Start: 2024-08-23 | End: 2024-08-23

## 2024-08-23 RX ORDER — LIDOCAINE HYDROCHLORIDE 10 MG/ML
INJECTION INFILTRATION; PERINEURAL AS NEEDED
Status: DISCONTINUED | OUTPATIENT
Start: 2024-08-23 | End: 2024-08-23 | Stop reason: HOSPADM

## 2024-08-23 RX ORDER — OXYCODONE HYDROCHLORIDE 5 MG/1
5 TABLET ORAL ONCE AS NEEDED
Status: COMPLETED | OUTPATIENT
Start: 2024-08-23 | End: 2024-08-23

## 2024-08-23 RX ORDER — FENTANYL CITRATE 50 UG/ML
50 INJECTION, SOLUTION INTRAMUSCULAR; INTRAVENOUS EVERY 5 MIN PRN
Status: DISCONTINUED | OUTPATIENT
Start: 2024-08-23 | End: 2024-08-28 | Stop reason: HOSPADM

## 2024-08-23 RX ORDER — LABETALOL HYDROCHLORIDE 5 MG/ML
5 INJECTION, SOLUTION INTRAVENOUS ONCE AS NEEDED
Status: DISCONTINUED | OUTPATIENT
Start: 2024-08-23 | End: 2024-08-28 | Stop reason: HOSPADM

## 2024-08-23 RX ORDER — ALBUTEROL SULFATE 0.83 MG/ML
2.5 SOLUTION RESPIRATORY (INHALATION) ONCE AS NEEDED
Status: DISCONTINUED | OUTPATIENT
Start: 2024-08-23 | End: 2024-08-28 | Stop reason: HOSPADM

## 2024-08-23 RX ORDER — OXYCODONE AND ACETAMINOPHEN 5; 325 MG/1; MG/1
1 TABLET ORAL EVERY 6 HOURS PRN
Qty: 28 TABLET | Refills: 0 | Status: SHIPPED | OUTPATIENT
Start: 2024-08-23 | End: 2024-08-30

## 2024-08-23 RX ORDER — BUPIVACAINE HYDROCHLORIDE 5 MG/ML
INJECTION, SOLUTION PERINEURAL AS NEEDED
Status: DISCONTINUED | OUTPATIENT
Start: 2024-08-23 | End: 2024-08-23 | Stop reason: HOSPADM

## 2024-08-23 RX ORDER — HYDRALAZINE HYDROCHLORIDE 20 MG/ML
5 INJECTION INTRAMUSCULAR; INTRAVENOUS EVERY 30 MIN PRN
Status: DISCONTINUED | OUTPATIENT
Start: 2024-08-23 | End: 2024-08-28 | Stop reason: HOSPADM

## 2024-08-23 SDOH — HEALTH STABILITY: MENTAL HEALTH: CURRENT SMOKER: 0

## 2024-08-23 ASSESSMENT — PAIN DESCRIPTION - DESCRIPTORS
DESCRIPTORS: ACHING
DESCRIPTORS: ACHING

## 2024-08-23 ASSESSMENT — PAIN - FUNCTIONAL ASSESSMENT
PAIN_FUNCTIONAL_ASSESSMENT: 0-10

## 2024-08-23 ASSESSMENT — PAIN SCALES - GENERAL
PAINLEVEL_OUTOF10: 0 - NO PAIN
PAINLEVEL_OUTOF10: 4
PAINLEVEL_OUTOF10: 0 - NO PAIN
PAINLEVEL_OUTOF10: 5 - MODERATE PAIN
PAIN_LEVEL: 5
PAINLEVEL_OUTOF10: 4

## 2024-08-23 ASSESSMENT — COLUMBIA-SUICIDE SEVERITY RATING SCALE - C-SSRS
6. HAVE YOU EVER DONE ANYTHING, STARTED TO DO ANYTHING, OR PREPARED TO DO ANYTHING TO END YOUR LIFE?: NO
1. IN THE PAST MONTH, HAVE YOU WISHED YOU WERE DEAD OR WISHED YOU COULD GO TO SLEEP AND NOT WAKE UP?: NO
2. HAVE YOU ACTUALLY HAD ANY THOUGHTS OF KILLING YOURSELF?: NO

## 2024-08-23 ASSESSMENT — PAIN DESCRIPTION - ORIENTATION: ORIENTATION: LEFT

## 2024-08-23 ASSESSMENT — PAIN DESCRIPTION - LOCATION: LOCATION: FOOT

## 2024-08-23 NOTE — BRIEF OP NOTE
Date: 2024  OR Location: [facility]    Name: Regina Flaherty, : 1960, Age: 63 y.o., MRN: [id number], Sex: female    Diagnosis  Pre-op Diagnosis      * Acute osteomyelitis of left ankle or foot (Multi) [M86.172]     * Osteophyte of left foot [M25.775] Post-op Diagnosis     * Acute osteomyelitis of left ankle or foot (Multi) [M86.172]     * Osteophyte of left foot [M25.775]     Procedures  INCISION AND DRAINAGE,BONE,LOWER EXTREMITY  10867 - SC INCISION BONE CORTEX FOOT    Wound Closure  35943 - SC REPAIR COMPLEX F/C/C/M/N/AX/G/H/F 2.6-7.5 CM      Surgeons      * Carrie Magallon - Primary    Resident/Fellow/Other Assistant:  Surgeons and Role:     * Jaren Soto DPM - Resident - Assisting     * Earle Bran DPM - Fellow    Procedure Summary  Anesthesia: Anesthesia type not filed in the log.  ASA: III  Anesthesia Staff: Anesthesiologist: Keith Rogers MD  CRNA: BRYAN Carlson-CRNA  Estimated Blood Loss: 5mL  Intra-op Medications:   Administrations occurring from 0930 to 1115 on 24:   Medication Name Total Dose   lidocaine (Xylocaine) 10 mg/mL (1 %) injection 10 mL   BUPivacaine HCl (Marcaine) 0.5 % (5 mg/mL) injection 10 mL   clindamycin (Cleocin) 900 mg in dextrose 5% IV 50 mL 900 mg              Anesthesia Record               Intraprocedure I/O Totals          Intake    Propofol Drip 0.00 mL    The total shown is the total volume documented since Anesthesia Start was filed.    clindamycin (Cleocin) 900 mg in dextrose 5% IV 50 mL 50.00 mL    Total Intake 50 mL          Specimen:   ID Type Source Tests Collected by Time   1 : Bone Left Foot Tissue BONE RESECTION SURGICAL PATHOLOGY EXAM Carrie Magallon DPM 2024 1008        Staff:   Desirae: Peyton  Circulator: Nisha Dorsey Person: Qing Dorsey Person: [facility]          Complications:  None; patient tolerated the procedure well.     Disposition: PACU - hemodynamically stable.  Condition: stable  Specimens Collected:    ID Type Source Tests Collected by Time   1 : Bone Left Foot Tissue BONE RESECTION SURGICAL PATHOLOGY EXAM Carrie Magallon DPM 8/23/2024 1008     Attending Attestation: I was present and scrubbed for the entire procedure.    Carrie Magallon  Phone Number: [phone]

## 2024-08-23 NOTE — DISCHARGE INSTRUCTIONS
[facility] POST OPERATIVE INSTRUCTIONS    RESTRICTIONS AFTER ANESTHESIA:   - Do not drive, work with heavy equipment or sign legal documents for 24 hours  - Rest at home for 24 hours following anesthesia  - You may experience light-headedness, dizziness, nausea, or sleepiness after surgery.    Stay in bed if you experience these symptoms  - Do not stay alone. A responsible adult must be with you for 24 hours.   - Do not take any alcoholic beverages or sleeping pills for the next 18 hours  - You may experience muscle aches and sore throat  - If you experience difficulty breathing and/or shortness of breath, seek IMMEDIATE    medical attention.     DRESSING: Keep surgical area clean and dry. Do NOT remove dressing. You may notice blood upon your bandage. Bleeding is expected and your bandage may become stained. You may reinforce with gauze or ace bandage.     BATHING: Sponge bath only or use of a cast protector in the shower until first post op visit.    ACTIVITY:weight bearing as tolerated  - After discharge from the surgery center, go directly home and limit your activities.  - Keep children and pets away from your operative foot.  - No heavy lifting.  -  Wearing surgical shoe when walking.    ELEVATION: Elevate the operative site above the level of your heart for at least the next 3 days. This will help decrease pain and prevent swelling. Support the back of your knee with a pillow.     ICE: Use ice pack behind Left knee or at Left ankle for 20 minutes on and off every hour when awake for the next 3 days. Do NOT fall asleep while using the ice pack.     MEDICATION:   - Some degree of discomfort is to be expected after surgery and will improve gradually as the healing process continues. ***Pain medication has been prescribed for you.  - [PERCOCET] Recommend taking 1 pill every 6 hours as needed for pain. Can take up to 2 pills every 4 hours. Recommended taking the pain medication as scheduled for at least the first 24-72  hours following surgery.   - Please take the first dose of pain medication prior to local anesthesia wearing off (the operative site may remain numb anywhere from 4-20 hours after surgery). Set an alarm to take a dose of medication overnight.   - Do not drink alcohol, drive a car, operate any machinery, or work with heavy equipment while taking your prescription pain medication.  - It is common for patients to experience nausea and or vomiting from pain medication. In order to help with this you should take your pain medication with food. Additionally, you have been provided with a prescription for anti-nausea medication which may be taken as prescribed.  - Your pain medication may cause constipation. If you have not had a bowel movement for 3 days or more, you may consider an over-the-counter stool softener such as Milk of Magnesia or Colace.  - Additionally you have been prescribed Aspirin to prevent blood clots. Please take as prescribed.  - You may restart your at home prescriptions.     DIET: You may resume your diet. Advance your diet as tolerated. Keep yourself hydrated.                   POST OPERATIVE INSTRUCTIONS      IF YOU NOTICE:   Fever of 101 degrees or over.  Foul smelling or purulent (pus) drainage from operative site.  Increase in drainage .  Sudden onset of pain that is unrelieved with pain medication.  Observe operative extremity for circulation problems: change in color, coldness, numbness, or tingling.   If any symptoms occur, Call office immediately or after hours emergency number.      PHONE NUMBERS:    Please call [phone] if there are any issues or concerns. You may call this number after hours as well for the doctor on call.    FOLLOW UP APPOINTMENT:    Follow up in 1 week, or sooner if issues arise

## 2024-08-23 NOTE — ANESTHESIA POSTPROCEDURE EVALUATION
Patient: Regina Flaherty    Procedure Summary       Date: 08/23/24 Room / Location: [facility] OR 10 / Virtual [facility]    Anesthesia Start: 0932 Anesthesia Stop: 1036    Procedures:       INCISION AND DRAINAGE,BONE,LOWER EXTREMITY (Left)      Wound Closure (Left) Diagnosis:       Acute osteomyelitis of left ankle or foot (Multi)      Osteophyte of left foot      (Acute osteomyelitis of left ankle or foot (Multi) [M86.172])      (Osteophyte of left foot [M25.775])    Surgeons: Carrie Magallon DPM Responsible Provider: Keith Rogers MD    Anesthesia Type: MAC, general ASA Status: 3            Anesthesia Type: MAC, general    Vitals Value Taken Time   /83 08/23/24 1108   Temp 36 °C (96.8 °F) 08/23/24 1035   Pulse 52 08/23/24 1109   Resp 13 08/23/24 1109   SpO2 97 % 08/23/24 1109   Vitals shown include unfiled device data.    Anesthesia Post Evaluation    Patient location during evaluation: PACU  Patient participation: complete - patient participated  Level of consciousness: awake and alert  Pain score: 5  Pain management: adequate  Multimodal analgesia pain management approach  Airway patency: patent  Two or more strategies used to mitigate risk of obstructive sleep apnea  Cardiovascular status: acceptable and blood pressure returned to baseline  Respiratory status: acceptable  Hydration status: acceptable  Postoperative Nausea and Vomiting: none        There were no known notable events for this encounter.

## 2024-08-23 NOTE — PERIOPERATIVE NURSING NOTE
Patient received to [facility] [place] #7 from OR. Anesthesia at bedside. Report received. Initial assessment complete. VSS on Cardiac Monitor. Patient denies pain at present. Patient comfortable at present.

## 2024-08-23 NOTE — ANESTHESIA PREPROCEDURE EVALUATION
Patient: Regina Flaherty    Procedure Information       Date/Time: 08/23/24 0930    Procedures:       INCISION AND DRAINAGE,BONE,LOWER EXTREMITY (Left)      Wound Closure (Left)    Location: [facility] OR 10 / [facility]    Surgeons: Carrie Magallon DPM            Relevant Problems   Cardiac   (+) Abnormal ECG      Endocrine   (+) Diabetes mellitus, type 2 (Multi) (Resolved)      ID   (+) Osteomyelitis of ankle and foot (Multi)       Clinical information reviewed:    Allergies  Meds               NPO Detail:  NPO/Void Status  Carbohydrate Drink Given Prior to Surgery? : N  Date of Last Liquid: 08/23/24  Time of Last Liquid: 0550 (10 OZ COFFEE)  Date of Last Solid: 08/22/24  Time of Last Solid: 2100  Last Intake Type: Clear fluids  Time of Last Void: 0800         Physical Exam    Airway  Mallampati: I  TM distance: >3 FB  Neck ROM: full     Cardiovascular   Comments: deferred   Dental    Pulmonary   Comments: deferred   Abdominal     Comments: deferred           Anesthesia Plan    History of general anesthesia?: yes  History of complications of general anesthesia?: no    ASA 3     MAC and general     The patient is not a current smoker.  Patient did not smoke on day of procedure.  Education provided regarding risk of obstructive sleep apnea.  intravenous induction   Postoperative administration of opioids is intended.    Plan discussed with CRNA.

## 2024-08-23 NOTE — PERIOPERATIVE NURSING NOTE
ADMITTED TO [facility]. ALERT AND ORIENTED. GAIT STEADY. NO NOTED RESP. DISTRESS. ORIENTED TO THE SURROUNDINGS. VSS. FBS 99. IV STARTED AND FLUIDS STARTED AT [facility]. ANESTHESIA IN TO SEE PT. MD WILL BE IN FOR CONSENT. CALL BELL IN REACH. READY FOR PROCEDURE. NO NOTED DISTRESS.

## 2024-08-23 NOTE — OP NOTE
INCISION AND DRAINAGE,BONE,LOWER EXTREMITY (L), Wound Closure (L) Operative Note     Date: 2024  OR Location: [facility]    Name: Regina Flaherty, : 1960, Age: 63 y.o., MRN: [id number], Sex: female    Diagnosis  Pre-op Diagnosis      * Acute osteomyelitis of left ankle or foot (Multi) [M86.172]     * Osteophyte of left foot [M25.775] Post-op Diagnosis     * Acute osteomyelitis of left ankle or foot (Multi) [M86.172]     * Osteophyte of left foot [M25.775]     Procedures  INCISION AND DRAINAGE,BONE,LOWER EXTREMITY  55178 - ID INCISION BONE CORTEX FOOT    Wound Closure  04360 - ID REPAIR COMPLEX F/C/C/M/N/AX/G/H/F 2.6-7.5 CM      Surgeons      * Carrie Magallon - Primary    Resident/Fellow/Other Assistant:  Surgeons and Role:     * Jaren Soto DPM - Resident - Assisting     * Earle Bran DPM - Fellow    Procedure Summary  Anesthesia: Anesthesia type not filed in the log.  ASA: III  Anesthesia Staff: Anesthesiologist: Keith Rogers MD  CRNA: BRYAN Carlson-CRNA  Estimated Blood Loss: 5mL  Intra-op Medications:   Administrations occurring from 0930 to 1115 on 24:   Medication Name Total Dose   lidocaine (Xylocaine) 10 mg/mL (1 %) injection 20 mL   BUPivacaine HCl (Marcaine) 0.5 % (5 mg/mL) injection 10 mL   clindamycin (Cleocin) 900 mg in dextrose 5% IV 50 mL 900 mg              Anesthesia Record               Intraprocedure I/O Totals          Intake    Propofol Drip 0.00 mL    The total shown is the total volume documented since Anesthesia Start was filed.    clindamycin (Cleocin) 900 mg in dextrose 5% IV 50 mL 50.00 mL    Total Intake 50 mL          Specimen:   ID Type Source Tests Collected by Time   1 : Bone Left Foot Tissue BONE RESECTION SURGICAL PATHOLOGY EXAM Carrie Magallon DPM 2024 1008        Staff:   Flacoulator: Peyton  Circulator: Nisha Dorsey Person: Qing Dorsey Person: [facility]         Drains and/or Catheters: * None in log *    Tourniquet Times:   *  Missing tourniquet times found for documented tourniquets in lo *       Indications: Regina Flaherty is an 63 y.o. female who is having surgery for Acute osteomyelitis of left ankle or foot (Multi) [M86.172]  Osteophyte of left foot [M25.775].     The patient was seen in the preoperative area. The risks, benefits, complications, treatment options, non-operative alternatives, expected recovery and outcomes were discussed with the patient. The possibilities of reaction to medication, pulmonary aspiration, injury to surrounding structures, bleeding, recurrent infection, the need for additional procedures, failure to diagnose a condition, and creating a complication requiring transfusion or operation were discussed with the patient. The patient concurred with the proposed plan, giving informed consent.  The site of surgery was properly noted/marked if necessary per policy. The patient has been actively warmed in preoperative area. Preoperative antibiotics are not indicated. Venous thrombosis prophylaxis are not indicated.      Patient was consulted at length regarding the goals, risks, and benefits of surgical intervention and the most common complications including delayed healing, mal position, infection, numbness, swelling, DVT, and residual pain and possible need for revisional surgery. Also discussed were idiosyncratic risks such as loss of limb and/or death associated with adverse reactions to medications, anesthesia, VTE, or infection were discussed at length with the patient.  Expectations and goals of return to weightbearing/work/school time were discussed as tentative and somewhat variable upon patient's symptomatology postoperatively. Patient was encouraged to call or present to the office or my personal cell phone number if there are any unanswered questions.  The patient understands that one prescription for pain medication will be dispensed at the time of surgery only if needed. If they need a  second prescription, they will receive a pain management referral at that time, along with the refill, and there will be no further narcotic pain medication dispensed after the one refill.  Patient does wish to proceed with planned surgical intervention at the conclusion of this conversation. Significant time was spent filling out, and orally reiterating all printed preoperative paperwork and fully explaining intended procedure in layman's terms as well as confirming laterality. Patient denied having further questions at this time regarding the intended surgical procedure(s).    Patient was transferred from the pre operative holding area to the OR and placed on the OR table in a secure supine position.  Previously to transfer the patient did receive a popliteal and adductor block to the surgical extremity.  Anesthesia was administered per the anesthesiologist.  The surgical extremity was prepped and draped in sterile aseptic technique.  An appropriate time out was performed and all in the room were in agreement.  A well padded ankle tourniquet was applied.    Attention was then directed to the left foot in the location of the previous incision.  The incision was made in the same location utilizing a #10 blade and carried down through anatomical layers to the level of bone.  The third metatsarsal was identified and there was noted to be significant hypertrophic bone formation.  The metatarsal parabola was recreated with a sagittal saw and bone cuts were made to metatarsals 1,2, 3, 4 and 5.  The bone was passed to the back table and sent to pathology for histological examination.  The incision was irrigated with copious amounts of sterile saline.    The complex incision closure took care to excise the hyperkeratotic tissue and to offload the ball of the foot.  The closure consisting of rearranging the soft tissues for adequate coverage of the bone.  The incision site measured 10 cm.  The incision was closed with 2.0  vicryl, 2.0 monocryl, 2.0 and 3.0 nylon.       A dry, sterile dressing was applied consisting of adaptac, 4x4 gauze, kerlex and an ace bandage.      The patient tolerated the procedure and anesthesia well and without complication.  The patient was transferred from the OR to the PACU with all vital signs stable and vascular status unchanged to the surgical extremity.  Digits 1,2,3,4 and 5 were well perfused at the end of the procedure.       Complications:  None; patient tolerated the procedure well.    Disposition: PACU - hemodynamically stable.  Condition: stable           Carrie CARMELO Naun  Phone Number: [phone]

## 2024-08-27 LAB
LABORATORY COMMENT REPORT: NORMAL
PATH REPORT.FINAL DX SPEC: NORMAL
PATH REPORT.GROSS SPEC: NORMAL
PATH REPORT.RELEVANT HX SPEC: NORMAL
PATH REPORT.TOTAL CANCER: NORMAL

## 2024-08-28 VITALS
HEART RATE: 55 BPM | TEMPERATURE: 96.8 F | SYSTOLIC BLOOD PRESSURE: 145 MMHG | RESPIRATION RATE: 16 BRPM | OXYGEN SATURATION: 95 % | DIASTOLIC BLOOD PRESSURE: 80 MMHG

## 2024-08-29 ENCOUNTER — OFFICE VISIT (OUTPATIENT)
Dept: WOUND CARE | Facility: HOSPITAL | Age: 64
End: 2024-08-29
Payer: COMMERCIAL

## 2024-08-29 PROCEDURE — 99213 OFFICE O/P EST LOW 20 MIN: CPT

## 2024-09-05 ENCOUNTER — APPOINTMENT (OUTPATIENT)
Dept: WOUND CARE | Facility: HOSPITAL | Age: 64
End: 2024-09-05
Payer: COMMERCIAL

## 2024-09-12 ENCOUNTER — OFFICE VISIT (OUTPATIENT)
Dept: WOUND CARE | Facility: HOSPITAL | Age: 64
End: 2024-09-12
Payer: COMMERCIAL

## 2024-09-12 PROCEDURE — 99214 OFFICE O/P EST MOD 30 MIN: CPT

## 2024-09-19 ENCOUNTER — OFFICE VISIT (OUTPATIENT)
Dept: WOUND CARE | Facility: HOSPITAL | Age: 64
End: 2024-09-19
Payer: COMMERCIAL

## 2024-09-19 PROCEDURE — 99213 OFFICE O/P EST LOW 20 MIN: CPT

## 2024-09-26 ENCOUNTER — OFFICE VISIT (OUTPATIENT)
Dept: WOUND CARE | Facility: HOSPITAL | Age: 64
End: 2024-09-26
Payer: COMMERCIAL

## 2024-09-26 PROCEDURE — 99213 OFFICE O/P EST LOW 20 MIN: CPT

## 2024-10-03 ENCOUNTER — APPOINTMENT (OUTPATIENT)
Dept: WOUND CARE | Facility: HOSPITAL | Age: 64
End: 2024-10-03
Payer: COMMERCIAL

## 2024-10-24 ENCOUNTER — APPOINTMENT (OUTPATIENT)
Dept: WOUND CARE | Facility: HOSPITAL | Age: 64
End: 2024-10-24
Payer: COMMERCIAL

## (undated) DEVICE — BANDAGE, GAUZE, COTTON, STERILE, BULKEE II, 4.5IN X 4.1YD

## (undated) DEVICE — SUTURE, VICRYL, 2-0, 27 IN, BR/SH 27, VIOLET

## (undated) DEVICE — Device

## (undated) DEVICE — SUTURE, ETHILON, 3-0, FSL 30, BLACK

## (undated) DEVICE — SYRINGE, 10 CC, LUER LOCK

## (undated) DEVICE — SOLUTION, IRRIGATION, X RX SODIUM CHL 0.9%, 1000ML BTL

## (undated) DEVICE — DRAPE KIT, MINI C-ARM

## (undated) DEVICE — TUBING, SUCTION, 6MM X 10, CLEAN N-COND

## (undated) DEVICE — SUTURE, VICRYL, 3-0, 27 IN, SH

## (undated) DEVICE — GLOVE, SURGICAL, PROTEXIS PI BLUE W/NEUTHERA, 6.5, PF, LF

## (undated) DEVICE — CAUTERY, PENCIL, PUSH BUTTON, SMOKE EVAC, 70MM

## (undated) DEVICE — HANDPIECE, VERSAJET II EXACT, 45 DEG, 14MM

## (undated) DEVICE — SOLUTION, IRRIGATION, USP, SODIUM CHLORIDE 0.9%, 3000 ML, BAG

## (undated) DEVICE — BANDAGE, STRETCH, CONFORM, 3 IN X 4.1 YD, NS

## (undated) DEVICE — APPLICATOR, CHLORAPREP, W/ORANGE TINT, 26ML

## (undated) DEVICE — DRESSING, ADAPTIC, NON-ADHERENT, 3 X 8 IN

## (undated) DEVICE — BLADE, OSCILLATING/SAGITTAL, 25MM X 9MM

## (undated) DEVICE — GLOVE, SURGICAL, PROTEXIS PI , 6.5, PF, LF

## (undated) DEVICE — NEEDLE, HYPODERMIC, MONOJECT, 25 G X 1.5 IN, LUER LOCK HUB, RED

## (undated) DEVICE — BANDAGE, ELASTIC, 4 IN X 11 YDS, STERILE, LF

## (undated) DEVICE — SUTURE, VICRYL, 2-0, 36 IN, CT-1, UNDYED

## (undated) DEVICE — BANDAGE, ELASTIC,  6 IN X 11 YDS, STERILE, LF

## (undated) DEVICE — SUTURE, MONOCRYL, 3-0, 27 IN, SH, UNDYED

## (undated) DEVICE — SPONGE, GAUZE, AVANT, STERILE, NONWOVEN, 4PLY, 4 X 4, STANDARD

## (undated) DEVICE — SOLUTION, INJECTION, SODIUM CHLORIDE 9%, 3000ML

## (undated) DEVICE — GOWN, SURGICAL, SIRUS, NON REINFORCED, LARGE

## (undated) DEVICE — NEEDLE, HYPODERMIC, NEEDLE PRO, 25G X 1.5, ORANGE

## (undated) DEVICE — BITE BLOCK, SOFT, MOUTH, 3/4 X 4, LARGE, WHITE

## (undated) DEVICE — BANDAGE, ELASTIC, ACE, ACE, DOUBLE LENGTH, 6 X 550 IN, LF

## (undated) DEVICE — SUTURE, MONOCRYL, 4-0, 27 IN, PS-2, UNDYED

## (undated) DEVICE — DRESSING, ABDOMINAL, WET PRUF, TENDERSORB, 5 X 9 IN, STERILE

## (undated) DEVICE — GLOVE, PROTEXIS PI CLASSIC, SZ-7.5, PF, LF

## (undated) DEVICE — HANDPIECE, INTERPULSE, W/RETRACTABLE COAX FAN, STERILE

## (undated) DEVICE — WAX, BONE, 2.5 GM

## (undated) DEVICE — SUTURE, VICRYL, 2-0, 27 IN, SH, UNDYED